# Patient Record
Sex: MALE | Race: WHITE
[De-identification: names, ages, dates, MRNs, and addresses within clinical notes are randomized per-mention and may not be internally consistent; named-entity substitution may affect disease eponyms.]

---

## 2017-03-17 ENCOUNTER — HOSPITAL ENCOUNTER (EMERGENCY)
Dept: HOSPITAL 74 - FER | Age: 82
Discharge: HOME | End: 2017-03-17
Payer: COMMERCIAL

## 2017-03-17 VITALS — HEART RATE: 77 BPM | TEMPERATURE: 98 F | DIASTOLIC BLOOD PRESSURE: 79 MMHG | SYSTOLIC BLOOD PRESSURE: 138 MMHG

## 2017-03-17 VITALS — BODY MASS INDEX: 27.6 KG/M2

## 2017-03-17 DIAGNOSIS — E11.9: ICD-10-CM
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
## 2017-03-17 DIAGNOSIS — I10: ICD-10-CM

## 2017-03-17 DIAGNOSIS — I48.91: ICD-10-CM
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
## 2017-03-17 DIAGNOSIS — Z87.891: ICD-10-CM

## 2017-03-17 DIAGNOSIS — N40.0: ICD-10-CM

## 2017-03-17 DIAGNOSIS — F03.90: ICD-10-CM

## 2017-03-17 DIAGNOSIS — S51.011A: Primary | ICD-10-CM

## 2017-03-17 DIAGNOSIS — Z79.84: ICD-10-CM

## 2017-03-17 PROCEDURE — 0HQDXZZ REPAIR RIGHT LOWER ARM SKIN, EXTERNAL APPROACH: ICD-10-PCS

## 2017-03-17 NOTE — PDOC
History of Present Illness





<Kelley Husain - Last Filed: 03/17/17 15:28>





- General


History Source: Patient


Exam Limitations: No Limitations





- History of Present Illness


Initial Comments: 





03/17/17 14:52


The patient is a 83 year old male presenting with his wife, with a significant 

past medical history of HTN, atrial fibrillation, HLD and diabetes, who 

presents to the emergency department with right elbow pain and laceration. He 

states that he slipped on ice earlier today and landed on his elbow. He notes 

that he has pain, that ranges from mild to moderate, without radiation or 

modifying factors. He is able to bend and extend the elbow. He denies any head 

trauma or loss of consciousness. He denies any other kind of injuries. He notes 

that he received his tetanus shot 3 years ago. 





The patient denies chest pain, shortness of breath, headache and dizziness. 





Allergies: None 


Past surgical history: Appendectomy, cholecystectomy 


Social history: No alcohol, tobacco or drug use reported 





<Van Ireland - Last Filed: 03/17/17 17:24>





- General


Chief Complaint: Injury


Stated Complaint: RIGHT ELBOW INJURY


Time Seen by Provider: 03/17/17 13:44





Past History





- Past Medical History


Anemia: No


Asthma: No


Cancer: No


Cardiac Disorders: Yes (ATRIAL FIBRILLATION,1990S)


CVA: No


COPD: No


CHF: No


Dementia:  (MEMORY LOSS)


Diabetes: Yes


GI Disorders: Yes


 Disorders: Yes (BPH)


HTN: Yes


Hypercholesterolemia: Yes


Liver Disease: No


Seizures: No


Thyroid Disease: No





- Surgical History


Abdominal Surgery:  (HERNIA REPAIR??)


Appendectomy: Yes


Cardiac Surgery: No


Cholecystectomy: Yes


Lung Surgery: No


Neurologic Surgery: No


Orthopedic Surgery: No





- Psycho/Social/Smoking Cessation Hx


Anxiety: No


Suicidal Ideation: No


Smoking History: Former smoker


Have you smoked in the past 12 months: No


If you are a former smoker, when did you quit?: 15 YRS AGO


Information on smoking cessation initiated: No


Hx Alcohol Use: No


Drug/Substance Use Hx: No


Substance Use Type: None


Hx Substance Use Treatment: No





<Kelley Husain - Last Filed: 03/17/17 15:28>





<Van Ireland - Last Filed: 03/17/17 17:24>





- Past Medical History


Allergies/Adverse Reactions: 


 Allergies











Allergy/AdvReac Type Severity Reaction Status Date / Time


 


tetanus immune globulin AdvReac Unknown UNSURE Verified 03/17/17 13:44











Home Medications: 


Ambulatory Orders





Aspirin [ASA -] 81 mg PO DAILY 06/26/14 


Atorvastatin Ca [Lipitor -] 30 mg PO HS 06/26/14 


Folic Acid/Multivit-Min/Lutein [Centrum Silver Chewable Tablet] 1 each PO 

UTDICT 06/26/14 


Metformin HCl [Metformin HCl ER] 500 mg PO BID 06/26/14 


Metoprolol Succinate [Toprol XL -] 150 mg PO DAILY 06/26/14 


Tamsulosin HCl 0.4 mg PO HS 06/26/14 


Valsartan [Diovan] 80 mg PO HS 06/26/14 


Vits A,C,E/Lutein/Minerals [Ocuvite Tablet] 1 each PO Q48H 06/26/14 


Cephalexin Monohydrate [Keflex -] 500 mg PO Q6H #28 capsule 03/17/17 


Memantine HCl [Namenda Xr] 21 mg PO DAILY 03/17/17 


Vit C/E/Zn/Coppr/Lutein/Zeaxan [Preservision Areds 2 Softgel] 1 each PO DAILY 03 /17/17 











**Review of Systems





- Review of Systems


Able to Perform ROS?: Yes


Comments:: 


03/17/17 14:52


GENERAL/CONSTITUTIONAL: No fever or chills. No weakness.


HEAD, EYES, EARS, NOSE AND THROAT: No change in vision. No ear pain or 

discharge. No sore throat.


MUSCULOSKELETAL: No joint or muscle swelling or pain. No neck or back pain.


EXTREMITIES: +Right elbow pain and laceration 


SKIN: No rash





<Van Ireland - Last Filed: 03/17/17 17:24>





*Physical Exam





- Vital Signs


 Last Vital Signs











Temp Pulse Resp BP Pulse Ox


 


 98 F   77   18   138/79   99 


 


 03/17/17 13:49  03/17/17 13:49  03/17/17 13:49  03/17/17 13:49  03/17/17 13:49














<Kelley Husain - Last Filed: 03/17/17 15:28>





- Vital Signs


 Last Vital Signs











Temp Pulse Resp BP Pulse Ox


 


 98 F   77   18   138/79   99 


 


 03/17/17 13:49  03/17/17 13:49  03/17/17 13:49  03/17/17 13:49  03/17/17 13:49














- Physical Exam


Comments: 


03/17/17 15:15


GENERAL: Awake, alert, and fully oriented, in no acute distress


HEAD: No signs of trauma, normocephalic, atraumatic 


EXTREMITIES: +2 cm irregular laceration to the right posterior elbow. Edema to 

the posterior part of the elbow. ,Remainder of extremities normal range of 

motion. No clubbing or cyanosis. 


SKIN: Warm, Dry, normal turgor, no rashes or lesions noted. 





<Van Ireland - Last Filed: 03/17/17 17:24>





Procedures





- Laceration/Wound Repair


  ** Right Posterior Elbow


Wound Length: to 2.5 cm


Wound Explored: clean, no foreign body present


Wound's Depth, Shape: irregular, contused tissue


Irrigated w/ Saline: Yes


Anesthesia: 1% Lidocaine


Amount of Anesthetic (ccs): 2


Wound Repaired With: Sutures


Suture Size/Type: 3:0, nylon


Layer Closure: No


Sterile Dressing Applied: Yes





<Kelley Husain - Last Filed: 03/17/17 15:28>





ED Treatment Course





- RADIOLOGY


Radiograph Interpretation: 





03/17/17 15:14


Right elbow x-ray


Reviewed by: Dr. Aashish Leslie


Impression: No acute bony abnormalities are seen.


Intact radial head. 





<Van Ireland - Last Filed: 03/17/17 17:24>





*DC/Admit/Observation/Transfer





- Discharge Dispostion


Admit: No





<Kelley Husain - Last Filed: 03/17/17 15:28>





- Attestations


Scribe Attestion: 





03/17/17 14:52


Documentation prepared by Van Ireland, acting as medical scribe for 

Kelley Husain MD





<Van Ireland - Last Filed: 03/17/17 17:24>


Diagnosis at time of Disposition: 


Laceration of elbow


Qualifiers:


 Encounter type: initial encounter Laterality: right Qualified Code(s): 

S51.011A - Laceration without foreign body of right elbow, initial encounter





- Discharge Dispostion


Disposition: HOME


Condition at time of disposition: Improved





- Prescriptions


Prescriptions: 


Cephalexin Monohydrate [Keflex -] 500 mg PO Q6H #28 capsule





- Patient Instructions


Printed Discharge Instructions:  DI for Laceration Repair -- Simple


Additional Instructions: 


KEEP THE WOUND DRY FOR 48 HOURS. AFTER THAT YOU CAN GET IT WET. DO NOT APPLY 

ANY LOTIONS, SOAPS, CREAMS, OINTMENTS. IF YOU WISH, KEEP A DRY GAUZE OVER IT 

AFTER 48 HOURS TO PREVENT THEM FROM CATCHING ON YOUR CLOTHING.





RETURN TO THE ER BETWEEN 3/26-3/28 TO HAVE THE STITCHES REMOVED. 





RETURN TO ER IMMEDIATELY IF YOU HAVE SWELLING, REDNESS, SEVERE PAIN, OR 

DRAINAGE OF PUS.

## 2017-03-20 ENCOUNTER — HOSPITAL ENCOUNTER (EMERGENCY)
Dept: HOSPITAL 74 - FER | Age: 82
LOS: 1 days | Discharge: HOME | End: 2017-03-21
Payer: COMMERCIAL

## 2017-03-20 VITALS — TEMPERATURE: 98.1 F | HEART RATE: 75 BPM | DIASTOLIC BLOOD PRESSURE: 68 MMHG | SYSTOLIC BLOOD PRESSURE: 135 MMHG

## 2017-03-20 VITALS — BODY MASS INDEX: 27.7 KG/M2

## 2017-03-20 DIAGNOSIS — E11.9: ICD-10-CM

## 2017-03-20 DIAGNOSIS — N40.0: ICD-10-CM

## 2017-03-20 DIAGNOSIS — Z87.891: ICD-10-CM

## 2017-03-20 DIAGNOSIS — Z79.84: ICD-10-CM

## 2017-03-20 DIAGNOSIS — L03.113: Primary | ICD-10-CM

## 2017-03-20 DIAGNOSIS — I10: ICD-10-CM

## 2017-03-20 DIAGNOSIS — I48.91: ICD-10-CM

## 2017-03-20 LAB
ALBUMIN SERPL-MCNC: 4 G/DL (ref 3.5–5)
ALP SERPL-CCNC: 58 U/L (ref 32–92)
ALT SERPL-CCNC: 15 U/L (ref 10–40)
ANION GAP SERPL CALC-SCNC: 5 MMOL/L (ref 8–16)
AST SERPL-CCNC: 25 U/L (ref 10–42)
BASOPHILS # BLD: 1 % (ref 0–2)
BILIRUB SERPL-MCNC: 0.6 MG/DL (ref 0.2–1)
CALCIUM SERPL-MCNC: 9.8 MG/DL (ref 8.4–10.2)
CO2 SERPL-SCNC: 31 MMOL/L (ref 22–28)
CREAT SERPL-MCNC: 0.9 MG/DL (ref 0.6–1.3)
DEPRECATED RDW RBC AUTO: 13.3 % (ref 11.9–15.9)
EOSINOPHIL # BLD: 2.4 % (ref 0–4.5)
GLUCOSE SERPL-MCNC: 101 MG/DL (ref 74–106)
MCH RBC QN AUTO: 32.4 PG (ref 25.7–33.7)
MCHC RBC AUTO-ENTMCNC: 33.4 G/DL (ref 32–35.9)
MCV RBC: 96.8 FL (ref 80–96)
NEUTROPHILS # BLD: 73.3 % (ref 42.8–82.8)
PLATELET # BLD AUTO: 189 K/MM3 (ref 134–434)
PMV BLD: 9 FL (ref 7.5–11.1)
PROT SERPL-MCNC: 6.8 G/DL (ref 6.4–8.3)
WBC # BLD AUTO: 8.4 K/MM3 (ref 4–10)

## 2017-03-20 NOTE — PDOC
History of Present Illness





- General


Chief Complaint: Revisit,Wound Recheck


Stated Complaint: RT ELBOW WOUND CHECK, SUTURES IN PLACE


Time Seen by Provider: 03/20/17 21:17





- History of Present Illness


Initial Comments: 





This 83-year-old man with a history of hypertension and type II DM (reportedly 

well controlled on metformin only) presents with increased swelling/redness and 

pain in the area of laceration of the right elbow.  Patient had slipped on the 

ice 3 days ago and was seen here in the emergency room.  Workup including elbow 

x-ray was negative at that time and laceration was sutured.  Patient was sent 

home on Keflex course; patient and wife state that he has been taking the 

antibiotic as prescribed.  There has been no fever or chills.  Initial bandage 

was taken off earlier today; wife noted that the area looked somewhat red and 

brought the patient in this evening for evaluation.  Patient states that there 

is some white of an increase in pain on direct palpation of the olecranon but 

notes no increase in pain without palpitation.


No history of poor wound healing; no history of resistant organism colonization 

or infection.





Medications as noted below.








Past History





- Past Medical History


Allergies/Adverse Reactions: 


 Allergies











Allergy/AdvReac Type Severity Reaction Status Date / Time


 


tetanus immune globulin AdvReac Unknown UNSURE Verified 03/20/17 21:15











Home Medications: 


Ambulatory Orders





Aspirin [ASA -] 81 mg PO DAILY 06/26/14 


Atorvastatin Ca [Lipitor -] 30 mg PO HS 06/26/14 


Folic Acid/Multivit-Min/Lutein [Centrum Silver Chewable Tablet] 1 each PO 

UTDICT 06/26/14 


Metformin HCl [Metformin HCl ER] 500 mg PO BID 06/26/14 


Metoprolol Succinate [Toprol XL -] 150 mg PO DAILY 06/26/14 


Tamsulosin HCl 0.4 mg PO HS 06/26/14 


Valsartan [Diovan] 80 mg PO HS 06/26/14 


Vits A,C,E/Lutein/Minerals [Ocuvite Tablet] 1 each PO Q48H 06/26/14 


Cephalexin Monohydrate [Keflex -] 500 mg PO Q6H #28 capsule 03/17/17 


Memantine HCl [Namenda Xr] 21 mg PO DAILY 03/17/17 


Vit C/E/Zn/Coppr/Lutein/Zeaxan [Preservision Areds 2 Softgel] 1 each PO DAILY 03 /17/17 


Clindamycin HCl 300 mg PO TID #21 capsule 03/21/17 








Anemia: No


Asthma: No


Cancer: No


Cardiac Disorders: Yes (ATRIAL FIBRILLATION,1990S)


CVA: No


COPD: No


CHF: No


Dementia:  (MEMORY LOSS)


Diabetes: Yes


GI Disorders: Yes


 Disorders: Yes (BPH)


HTN: Yes


Hypercholesterolemia: Yes


Liver Disease: No


Seizures: No


Thyroid Disease: No





- Surgical History


Abdominal Surgery:  (HERNIA REPAIR??)


Appendectomy: Yes


Cardiac Surgery: No


Cholecystectomy: Yes


Lung Surgery: No


Neurologic Surgery: No


Orthopedic Surgery: No





- Psycho/Social/Smoking Cessation Hx


Anxiety: No


Suicidal Ideation: No


Smoking History: Former smoker


Have you smoked in the past 12 months: No


If you are a former smoker, when did you quit?: 15 YRS AGO


Information on smoking cessation initiated: No


Hx Alcohol Use: No


Drug/Substance Use Hx: No


Substance Use Type: None


Hx Substance Use Treatment: No





**Review of Systems





- Review of Systems


Able to Perform ROS?: Yes


Comments:: 





12 point review of systems is negative except for what is noted in the history 

of present illness








*Physical Exam





- Vital Signs


 Last Vital Signs











Temp Pulse Resp BP Pulse Ox


 


 98.1 F   75   18   135/68   97 


 


 03/20/17 21:15  03/20/17 21:15  03/20/17 21:15  03/20/17 21:15  03/20/17 21:15














- Physical Exam


Comments: 





GENERAL: Adult male, alert and oriented 3, in no acute distress


HEAD: Normal with no signs of trauma.


EYES: PERRLA, EOMI, sclera anicteric, conjunctiva clear.


ENT: Ears normal, nares patent, oropharynx clear without exudates.  Dry mucous 

membranes.


NECK: Normal range of motion, supple without lymphadenopathy, JVD, or masses.


LUNGS: Breath sounds equal, clear to auscultation bilaterally.  No wheezes, and 

no crackles.


HEART:Regular rate and rhythm, normal S1 and S2 without murmur, rub or gallop.


ABDOMEN:.normal bowel sounds  No guarding,tenderness or rebound.No masses No 

distention. 


EXTREMITIES: Right upper extremitymildly edematous, moderately erythematous 3 

x 8 cm area surrounding sutured laceration 


                                                       Laceration is in the 

volar aspect of the proximal aspect of fo forearm and intact


                                                       olecranon processes 

mildly tender


.                                                       No fluctuance or 

purulent discharge from the wound


                                                     No lymphangitic streaking


                               Remainder of the extremity exam is normal





NEURO: Cranial nerves II through XII grossly intact.  Normal speech.  No focal 


neurological deficits. 


MUSCULOSKELETAL:  Back non-tender to palpation, no CVA tenderness


SKIN: Warm, Dry, normal turgor, no rashes or lesions noted.








ED Treatment Course





- LABORATORY


CBC & Chemistry Diagram: 


 03/20/17 22:50





 03/20/17 22:50





Medical Decision Making





- Medical Decision Making





CBC and chemistry profile sent


1 g vancomycin IV administered.





Laboratory evaluation essentially normal.





Repeat right elbow x-ray performed to evaluate for presence of gas in soft 

tissue: None seen.





Clinical presentation most consistent with cellulitis in area of laceration.  

No drainage present for culture and sensitivity testing.  Patient was started 

on clindamycin 300 mg 3 times a day with follow-up with his general doctor 

within the next 2-3 days.


He should return to the emergency room if pain/swelling/redness worsens; if he 

notices any lymphangitic streaking or fever develops.








*DC/Admit/Observation/Transfer


Diagnosis at time of Disposition: 


 Cellulitis of right elbow





- Discharge Dispostion


Disposition: HOME


Condition at time of disposition: Stable





- Prescriptions


Prescriptions: 


Clindamycin HCl 300 mg PO TID #21 capsule





- Referrals


Referrals: 


Ludwin Gonzalez RN [Primary Care Provider] - 





- Patient Instructions


Printed Discharge Instructions:  Cellulitis


Additional Instructions: 


stop keflex


begin Clindamycin 300mg 3 X day for 1 week


followup with your doctor within 2 days


return to ER if you have worsening swelling/pain/redness/warmth of area


return to ER if you have any red streaking up your arm or fever

## 2018-05-30 ENCOUNTER — APPOINTMENT (OUTPATIENT)
Dept: CARDIOLOGY | Facility: CLINIC | Age: 83
End: 2018-05-30

## 2018-05-30 VITALS
HEART RATE: 62 BPM | TEMPERATURE: 98.2 F | HEIGHT: 73 IN | BODY MASS INDEX: 27.83 KG/M2 | SYSTOLIC BLOOD PRESSURE: 117 MMHG | OXYGEN SATURATION: 94 % | WEIGHT: 210 LBS | DIASTOLIC BLOOD PRESSURE: 79 MMHG

## 2018-05-30 DIAGNOSIS — N40.0 BENIGN PROSTATIC HYPERPLASIA WITHOUT LOWER URINARY TRACT SYMPMS: ICD-10-CM

## 2018-05-30 DIAGNOSIS — Z87.891 PERSONAL HISTORY OF NICOTINE DEPENDENCE: ICD-10-CM

## 2018-05-30 DIAGNOSIS — Z86.69 PERSONAL HISTORY OF OTHER DISEASES OF THE NERVOUS SYSTEM AND SENSE ORGANS: ICD-10-CM

## 2018-05-30 DIAGNOSIS — Z86.79 PERSONAL HISTORY OF OTHER DISEASES OF THE CIRCULATORY SYSTEM: ICD-10-CM

## 2018-05-30 DIAGNOSIS — Z82.49 FAMILY HISTORY OF ISCHEMIC HEART DISEASE AND OTHER DISEASES OF THE CIRCULATORY SYSTEM: ICD-10-CM

## 2018-05-30 DIAGNOSIS — Z86.39 PERSONAL HISTORY OF OTHER ENDOCRINE, NUTRITIONAL AND METABOLIC DISEASE: ICD-10-CM

## 2018-05-30 DIAGNOSIS — Z78.9 OTHER SPECIFIED HEALTH STATUS: ICD-10-CM

## 2018-05-30 DIAGNOSIS — Z87.19 PERSONAL HISTORY OF OTHER DISEASES OF THE DIGESTIVE SYSTEM: ICD-10-CM

## 2018-05-30 PROBLEM — Z00.00 ENCOUNTER FOR PREVENTIVE HEALTH EXAMINATION: Status: ACTIVE | Noted: 2018-05-30

## 2018-06-11 RX ORDER — VIT A/VIT C/VIT E/ZINC/COPPER 2148-113
TABLET ORAL
Refills: 0 | Status: ACTIVE | COMMUNITY

## 2018-06-11 RX ORDER — MULTIVITAMIN
TABLET ORAL
Refills: 0 | Status: ACTIVE | COMMUNITY

## 2018-06-25 ENCOUNTER — NON-APPOINTMENT (OUTPATIENT)
Age: 83
End: 2018-06-25

## 2018-06-25 PROBLEM — Z86.39 HISTORY OF DIABETES MELLITUS: Status: RESOLVED | Noted: 2018-06-25 | Resolved: 2018-06-25

## 2018-06-25 PROBLEM — Z86.79 HISTORY OF HYPERTENSION: Status: RESOLVED | Noted: 2018-06-25 | Resolved: 2018-06-25

## 2018-06-25 PROBLEM — Z86.79 HISTORY OF RIGHT BUNDLE BRANCH BLOCK (RBBB): Status: RESOLVED | Noted: 2018-06-25 | Resolved: 2018-06-25

## 2018-06-25 PROBLEM — Z86.69 HISTORY OF MACULAR DEGENERATION: Status: RESOLVED | Noted: 2018-06-25 | Resolved: 2018-06-25

## 2018-06-25 PROBLEM — N40.0 BPH WITHOUT URINARY OBSTRUCTION: Status: RESOLVED | Noted: 2018-06-25 | Resolved: 2018-06-25

## 2018-06-26 PROBLEM — Z82.49 FAMILY HISTORY OF CONGESTIVE HEART FAILURE: Status: ACTIVE | Noted: 2018-06-26

## 2018-06-26 PROBLEM — Z87.19 HISTORY OF GASTROESOPHAGEAL REFLUX (GERD): Status: RESOLVED | Noted: 2018-06-26 | Resolved: 2018-06-26

## 2018-06-26 PROBLEM — Z78.9 SOCIAL ALCOHOL USE: Status: ACTIVE | Noted: 2018-06-26

## 2018-06-26 PROBLEM — Z87.891 FORMER SMOKER: Status: ACTIVE | Noted: 2018-06-26

## 2018-07-31 ENCOUNTER — RX RENEWAL (OUTPATIENT)
Age: 83
End: 2018-07-31

## 2018-11-01 ENCOUNTER — RECORD ABSTRACTING (OUTPATIENT)
Age: 83
End: 2018-11-01

## 2018-11-19 ENCOUNTER — RX RENEWAL (OUTPATIENT)
Age: 83
End: 2018-11-19

## 2018-11-28 ENCOUNTER — APPOINTMENT (OUTPATIENT)
Dept: CARDIOLOGY | Facility: CLINIC | Age: 83
End: 2018-11-28
Payer: MEDICARE

## 2018-11-28 VITALS
HEIGHT: 73 IN | OXYGEN SATURATION: 100 % | SYSTOLIC BLOOD PRESSURE: 123 MMHG | BODY MASS INDEX: 28.36 KG/M2 | WEIGHT: 214 LBS | DIASTOLIC BLOOD PRESSURE: 55 MMHG | TEMPERATURE: 98.1 F | HEART RATE: 63 BPM

## 2018-11-28 PROCEDURE — 93000 ELECTROCARDIOGRAM COMPLETE: CPT

## 2018-11-28 PROCEDURE — 99215 OFFICE O/P EST HI 40 MIN: CPT

## 2018-11-28 NOTE — REVIEW OF SYSTEMS
[Feeling Fatigued] : feeling fatigued [Eyeglasses] : currently wearing eyeglasses [Loss Of Hearing] : hearing loss [see HPI] : see HPI [Joint Pain] : joint pain [Negative] : Heme/Lymph

## 2018-11-29 ENCOUNTER — APPOINTMENT (OUTPATIENT)
Dept: PULMONOLOGY | Facility: CLINIC | Age: 83
End: 2018-11-29

## 2018-11-29 ENCOUNTER — APPOINTMENT (OUTPATIENT)
Dept: PODIATRY | Facility: CLINIC | Age: 83
End: 2018-11-29
Payer: MEDICARE

## 2018-11-29 VITALS
HEIGHT: 73 IN | WEIGHT: 210 LBS | BODY MASS INDEX: 27.83 KG/M2 | DIASTOLIC BLOOD PRESSURE: 70 MMHG | SYSTOLIC BLOOD PRESSURE: 110 MMHG

## 2018-11-29 PROCEDURE — 11721 DEBRIDE NAIL 6 OR MORE: CPT

## 2018-11-29 RX ORDER — RIVASTIGMINE 9.5 MG/24H
9.5 PATCH, EXTENDED RELEASE TRANSDERMAL DAILY
Refills: 0 | Status: DISCONTINUED | COMMUNITY
End: 2018-11-29

## 2018-12-06 ENCOUNTER — NON-APPOINTMENT (OUTPATIENT)
Age: 83
End: 2018-12-06

## 2018-12-06 NOTE — HISTORY OF PRESENT ILLNESS
[FreeTextEntry1] : 85-year-old man\par Routine followup\par Boby states that he feels well and denies chest pain palpitations or syncope. His wife reports that she feels he experiences mild dyspnea on exertion. The symptoms are not progressive or severe. No orthopnea. No ankle edema.\par \par Mr. Mir is accompanied today by his wife.

## 2018-12-06 NOTE — DISCUSSION/SUMMARY
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 Boby has had atrial fibrillation since 1988. Prior attempts at electrocardioversion were unsuccessful. Adequate rate control of the ventricular response is achieved with the present medical regimen. A 3/14 Holter monitor demonstrated atrial fibrillation with an average ventricular rate of 64/minute. A 3.2 second pause without symptom correlation was noted. Lanoxin therapy administered at that time was discontinued. An elevated "[id number]" score is indicative of an increased risk of systemic/renal emboli. However due to concerns for bleeding in the setting of vascular dementia the risk of anticoagulation/antithrombotic therapy outweighs any potential benefit.\par \par I have recommended the following:\par 1 continue present medical regimen\par 2 no further cardiac testing for this problem at this time\par \par \par Hypertension\par Hypertension is controlled on the present medical regimen. In the setting of diabetes and probable atherosclerotic heart disease, beta blocker and Ace-I./ARB therapy are attractive. The dose of Diovan may be increased if required to maintain optimal levels\par \par I have recommended the following:\par 1 continue the present medical regimen\par 2 increased dose of Diovan if required to maintain optimal levels as discussed above.\par \par \par Valvular heart disease\par The 3/14 echocardiogram demonstrated mild-moderate mitral and aortic insufficiency. Mild pulmonary hypertension was noted with a pulmonary artery systolic pressure of 38 mmHg. Left ventricle systolic function was normal with an ejection fraction of 62%. The present cardiac physical examination is not suggestive of severe aortic/mitral regurgitation.Echocardiography would be helpful to reassess left ventricular and valvular function.\par \par I have recommended the following:\par 1 echocardiogram with next office evaluation in 6 months.\par \par \par Hyperlipidemia\par Hyperlipidemia represents a risk factor for the development of atherosclerotic heart disease. However there is no evidence of such to date. A 3/11 and stress sestamibi study was normal. The left ventricle ejection fraction was 56% . The resting 11/18 electrocardiogram shows no evidence of myocardial ischemia or infarction. The target LDL level for primary prevention is about 100. HMG CoA inhibitor therapy has been effective. In 4/18 the  serum cholesterol level was 123 HDL 49 LDL 58  and triglycerides 77. Consideration will be given towards a reduction in dose of Lipitor dependent on followup levels. Non-pharmacological therapy, specifically diet and exercise are emphasized as major aspects of treatment.\par \par I have recommended the following:\par 1 low-salt low-fat low-cholesterol diet. Regular aerobic exercise\par 2 continue present medical regimen\par 3 target LDL level to about 100 as discussed above\par 4 consideration for a reduction in atorvastatin dose dependent on followup lipid levels as noted above.\par 5 routine laboratory studies including lipid profile through primary care Dr. Celestin\par \par \par \par Dementia\par The working diagnosis is vascular dementia. In the setting of chronic atrial fibrillation multi-infarct dementia is a likely etiology. This problem represents the patient's major health threat\par \par I have recommended a followup:\par 1. Workup and treatment as directed by internal medicine

## 2018-12-06 NOTE — PHYSICAL EXAM
[Normal Conjunctiva] : the conjunctiva exhibited no abnormalities [Auscultation Breath Sounds / Voice Sounds] : lungs were clear to auscultation bilaterally [Heart Sounds] : normal S1 and S2 [Murmurs] : no murmurs present [Bowel Sounds] : normal bowel sounds [Abdomen Soft] : soft [Abdomen Tenderness] : non-tender [Abnormal Walk] : normal gait [Cyanosis, Localized] : no localized cyanosis [] : no rash [Affect] : the affect was normal [FreeTextEntry1] : pleasant but forgetful

## 2018-12-18 ENCOUNTER — APPOINTMENT (OUTPATIENT)
Dept: INTERNAL MEDICINE | Facility: CLINIC | Age: 83
End: 2018-12-18
Payer: MEDICARE

## 2018-12-18 VITALS
HEIGHT: 73 IN | DIASTOLIC BLOOD PRESSURE: 66 MMHG | BODY MASS INDEX: 28.36 KG/M2 | SYSTOLIC BLOOD PRESSURE: 122 MMHG | WEIGHT: 214 LBS

## 2018-12-18 PROCEDURE — 99214 OFFICE O/P EST MOD 30 MIN: CPT

## 2018-12-18 NOTE — COUNSELING
[de-identified] : Pt counseled on proper diet and exercise. We discussed the importance of exercise in maintaining a healthy life style.\par

## 2018-12-18 NOTE — HISTORY OF PRESENT ILLNESS
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 All went well with them.\par Pt doing well overall. He is here with his wife. \par He has no chest pains or SOB. However does not get much activity overall. \par No recent falls\par

## 2018-12-18 NOTE — PHYSICAL EXAM
[No Acute Distress] : no acute distress [Well Nourished] : well nourished [Well Developed] : well developed [Well-Appearing] : well-appearing [No Respiratory Distress] : no respiratory distress  [Clear to Auscultation] : lungs were clear to auscultation bilaterally [Normal Rate] : normal rate  [Regular Rhythm] : with a regular rhythm [Normal S1, S2] : normal S1 and S2 [No Murmur] : no murmur heard [No Edema] : there was no peripheral edema [Soft] : abdomen soft [Non Tender] : non-tender [Non-distended] : non-distended [Normal Affect] : the affect was normal [Normal Insight/Judgement] : insight and judgment were intact [de-identified] : scars on scalp and face

## 2019-01-07 ENCOUNTER — RESULT REVIEW (OUTPATIENT)
Age: 84
End: 2019-01-07

## 2019-01-14 ENCOUNTER — INBOUND DOCUMENT (OUTPATIENT)
Age: 84
End: 2019-01-14

## 2019-01-21 ENCOUNTER — INBOUND DOCUMENT (OUTPATIENT)
Age: 84
End: 2019-01-21

## 2019-01-22 ENCOUNTER — RX RENEWAL (OUTPATIENT)
Age: 84
End: 2019-01-22

## 2019-01-23 ENCOUNTER — RX RENEWAL (OUTPATIENT)
Age: 84
End: 2019-01-23

## 2019-01-24 ENCOUNTER — APPOINTMENT (OUTPATIENT)
Dept: INTERNAL MEDICINE | Facility: CLINIC | Age: 84
End: 2019-01-24
Payer: MEDICARE

## 2019-01-24 VITALS
HEIGHT: 73 IN | DIASTOLIC BLOOD PRESSURE: 74 MMHG | BODY MASS INDEX: 27.17 KG/M2 | SYSTOLIC BLOOD PRESSURE: 124 MMHG | WEIGHT: 205 LBS

## 2019-01-24 PROCEDURE — 99495 TRANSJ CARE MGMT MOD F2F 14D: CPT

## 2019-01-24 RX ORDER — ASPIRIN 81 MG
81 TABLET, DELAYED RELEASE (ENTERIC COATED) ORAL
Refills: 0 | Status: DISCONTINUED | COMMUNITY
End: 2019-01-24

## 2019-01-24 RX ORDER — VALSARTAN 80 MG/1
80 TABLET ORAL
Refills: 0 | Status: DISCONTINUED | COMMUNITY
End: 2019-01-24

## 2019-01-24 NOTE — HISTORY OF PRESENT ILLNESS
[Admitted on: ___] : The patient was admitted on [unfilled] [Discharged on ___] : discharged on [unfilled] [Discharge Summary] : discharge summary [Pertinent Labs] : pertinent labs [Radiology Findings] : radiology findings [Discharge Med List] : discharge medication list [Med Reconciliation] : medication reconciliation has been completed [Patient Contacted By: ____] : and contacted by [unfilled] [FreeTextEntry2] : Pt was admitted to the hospital for CVA. Pt was brought to the hospital due to slurred speech by ambulance.  The speech improved by the next morning Pt had CT brain and CT angio as well as MRI brain showing cerebellar infarct. He was d/c/d to a rehab for over 1 week. \par Pt is still unsteady with his walking. He has no cane or walker. \par Otherwise he is back to baseline

## 2019-01-24 NOTE — REVIEW OF SYSTEMS
[Memory Loss] : memory loss [Negative] : Musculoskeletal [Dizziness] : no dizziness [de-identified] : chronic memory loss

## 2019-01-24 NOTE — PHYSICAL EXAM
[No Acute Distress] : no acute distress [Well Nourished] : well nourished [Well Developed] : well developed [Well-Appearing] : well-appearing [No Respiratory Distress] : no respiratory distress  [Clear to Auscultation] : lungs were clear to auscultation bilaterally [No Accessory Muscle Use] : no accessory muscle use [Normal Rate] : normal rate  [No Edema] : there was no peripheral edema [Normal S1, S2] : normal S1 and S2 [No Murmur] : no murmur heard [No Carotid Bruits] : no carotid bruits [Grossly Normal Strength/Tone] : grossly normal strength/tone [Normal Gait] : normal gait [No Focal Deficits] : no focal deficits [Normal Affect] : the affect was normal [Normal Mood] : the mood was normal [de-identified] : irreg [de-identified] : apparent memory issues

## 2019-01-30 ENCOUNTER — APPOINTMENT (OUTPATIENT)
Dept: CARDIOLOGY | Facility: CLINIC | Age: 84
End: 2019-01-30
Payer: MEDICARE

## 2019-01-30 VITALS
SYSTOLIC BLOOD PRESSURE: 126 MMHG | DIASTOLIC BLOOD PRESSURE: 54 MMHG | HEART RATE: 56 BPM | BODY MASS INDEX: 27.04 KG/M2 | OXYGEN SATURATION: 97 % | WEIGHT: 204 LBS | TEMPERATURE: 98.1 F | HEIGHT: 73 IN

## 2019-01-30 DIAGNOSIS — Z86.39 PERSONAL HISTORY OF OTHER ENDOCRINE, NUTRITIONAL AND METABOLIC DISEASE: ICD-10-CM

## 2019-01-30 DIAGNOSIS — Z86.73 PERSONAL HISTORY OF TRANSIENT ISCHEMIC ATTACK (TIA), AND CEREBRAL INFARCTION W/OUT RESIDUAL DEFICITS: ICD-10-CM

## 2019-01-30 PROCEDURE — 99215 OFFICE O/P EST HI 40 MIN: CPT

## 2019-02-02 PROBLEM — Z86.39 HISTORY OF HYPERLIPIDEMIA: Status: RESOLVED | Noted: 2018-06-25 | Resolved: 2019-02-02

## 2019-02-02 PROBLEM — Z86.73 HISTORY OF CEREBROVASCULAR ACCIDENT: Status: RESOLVED | Noted: 2019-02-02 | Resolved: 2019-02-02

## 2019-02-02 NOTE — DISCUSSION/SUMMARY
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 Boby has had atrial fibrillation since 1988. Prior attempts at electrocardioversion were unsuccessful. Adequate rate control of the ventricular response is achieved with the present medical regimen. A 3/14 Holter monitor demonstrated atrial fibrillation with an average ventricular rate of 64/minute. A 3.2 second pause without symptom correlation was noted. Lanoxin therapy administered at that time was discontinued. An elevated "[id number]" score is indicative of an increased risk of systemic/cerebral  emboli. .Prior  treatment with warfarin was discontinued due to concerns for falling/bleeding. However in 1/19 Apixaban was instituted following cardioembolic bilateral occipital strokes.\par \par I have recommended the following:\par 1 continue present medical regimen\par 2 no further cardiac testing for this problem at this time\par \par \par Hypertension\par Hypertension is controlled on the present medical regimen. In the setting of diabetes atrial fibrillation  and probable atherosclerotic heart disease, beta blocker and Ace-I./ARB therapy are attractive. The dose of losartan  may be increased if required to maintain optimal levels\par \par I have recommended the following:\par 1 continue the present medical regimen\par 2 increase dose losartan if required to maintain optimal levels as discussed above.\par \par \par Valvular heart disease\par The 1/19 echocardiogram demonstrated mild mitral insufficiency aortic valve sclerosis and mild aortic insufficiency. Mild-moderate tricuspid insufficiency was also noted. Moderate pulmonary hypertension was reflected by pulmonary artery systolic pressure of 45 mmHg. The left atrial volume was markedly increased at 122 mL/m2. The left ventricle ejection fraction was 60%. The present cardiac physical examination is not suggestive of severe mitral/aortic regurgitation. In view of the lack of symptoms, absence of heart failure and clinical course continued monitoring without intervention is indicated at this time.\par \par I have recommended the following:\par 1 no further cardiac testing for this problem at this time\par I\par \par \par Hyperlipidemia\par Hyperlipidemia represents a risk factor for the development of atherosclerotic heart disease. However there is no evidence of such to date. A 3/11  stress sestamibi study was normal. The left ventricle ejection fraction was 56% . The resting 11/18 electrocardiogram shows no evidence of myocardial ischemia or infarction. Now with a history of a stroke (Despite a cardioembolic etiology) the target LDL level is about 70.. HMG CoA inhibitor therapy has been effective. In 1/19  the  serum cholesterol level was 135  HDL 56  LDL 64 and triglycerides 72. . Non-pharmacological therapy, specifically diet and exercise are emphasized as major aspects of treatment.\par \par I have recommended the following:\par 1 low-salt low-fat low-cholesterol diet. Regular aerobic exercise\par 2 continue present medical regimen\par 3 target LDL level to about 100 as discussed above\par \par \par \par Dementia\par The working diagnosis is vascular dementia. In the setting of chronic atrial fibrillation multi-infarct dementia is a likely etiology. This problem represents the patient's major health threat\par .Further deterioration of mental and functional status is anticipated in the future with requirements for increasing social service help.\par \par I have recommended  the following:\par 1. Workup and treatment as directed by internal medicine

## 2019-02-02 NOTE — HISTORY OF PRESENT ILLNESS
[FreeTextEntry1] : 85-year-old man\par Routine followup of hospital\par 1/19 hospitalization for slurred speech abnormal gait nausea and vomiting. MRI of the brain demonstrated recent right cerebellar and small focus of left parietal occipital cortical infarcts Diagnosis cardioembolic stroke factor X A. inhibitor therapy initiated. See hospital records\par \par Mr. Mir denies chest pain palpitations shortness of breath or focal neurological deficit.\par \par He is accompanied by his wife who confirms the history  as Mr. Mir  is forgetful

## 2019-02-02 NOTE — DISCUSSION/SUMMARY
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 Boby has had atrial fibrillation since 1988. Prior attempts at electrocardioversion were unsuccessful. Adequate rate control of the ventricular response is achieved with the present medical regimen. A 3/14 Holter monitor demonstrated atrial fibrillation with an average ventricular rate of 64/minute. A 3.2 second pause without symptom correlation was noted. Lanoxin therapy administered at that time was discontinued. An elevated "[id number]" score is indicative of an increased risk of systemic/cerebral  emboli. .Prior  treatment with warfarin was discontinued due to concerns for falling/bleeding. However in 1/19 Apixaban was instituted following cardioembolic bilateral occipital strokes.\par \par I have recommended the following:\par 1 continue present medical regimen\par 2 no further cardiac testing for this problem at this time\par \par \par Hypertension\par Hypertension is controlled on the present medical regimen. In the setting of diabetes atrial fibrillation  and probable atherosclerotic heart disease, beta blocker and Ace-I./ARB therapy are attractive. The dose of losartan  may be increased if required to maintain optimal levels\par \par I have recommended the following:\par 1 continue the present medical regimen\par 2 increase dose losartan if required to maintain optimal levels as discussed above.\par \par \par Valvular heart disease\par The 1/19 echocardiogram demonstrated mild mitral insufficiency aortic valve sclerosis and mild aortic insufficiency. Mild-moderate tricuspid insufficiency was also noted. Moderate pulmonary hypertension was reflected by pulmonary artery systolic pressure of 45 mmHg. The left atrial volume was markedly increased at 122 mL/m2. The left ventricle ejection fraction was 60%. The present cardiac physical examination is not suggestive of severe mitral/aortic regurgitation. In view of the lack of symptoms, absence of heart failure and clinical course continued monitoring without intervention is indicated at this time.\par \par I have recommended the following:\par 1 no further cardiac testing for this problem at this time\par I\par \par \par Hyperlipidemia\par Hyperlipidemia represents a risk factor for the development of atherosclerotic heart disease. However there is no evidence of such to date. A 3/11  stress sestamibi study was normal. The left ventricle ejection fraction was 56% . The resting 11/18 electrocardiogram shows no evidence of myocardial ischemia or infarction. Now with a history of a stroke (Despite a cardioembolic etiology) the target LDL level is about 70.. HMG CoA inhibitor therapy has been effective. In 1/19  the  serum cholesterol level was 135  HDL 56  LDL 64 and triglycerides 72. . Non-pharmacological therapy, specifically diet and exercise are emphasized as major aspects of treatment.\par \par I have recommended the following:\par 1 low-salt low-fat low-cholesterol diet. Regular aerobic exercise\par 2 continue present medical regimen\par 3 target LDL level to about 100 as discussed above\par \par \par \par Dementia\par The working diagnosis is vascular dementia. In the setting of chronic atrial fibrillation multi-infarct dementia is a likely etiology. This problem represents the patient's major health threat\par .Further deterioration of mental and functional status is anticipated in the future with requirements for increasing social service help.\par \par I have recommended  the following:\par 1. Workup and treatment as directed by internal medicine

## 2019-02-04 ENCOUNTER — RX RENEWAL (OUTPATIENT)
Age: 84
End: 2019-02-04

## 2019-02-13 ENCOUNTER — RX RENEWAL (OUTPATIENT)
Age: 84
End: 2019-02-13

## 2019-02-15 ENCOUNTER — RX RENEWAL (OUTPATIENT)
Age: 84
End: 2019-02-15

## 2019-02-25 ENCOUNTER — APPOINTMENT (OUTPATIENT)
Dept: NEUROLOGY | Facility: CLINIC | Age: 84
End: 2019-02-25
Payer: MEDICARE

## 2019-02-25 VITALS
DIASTOLIC BLOOD PRESSURE: 65 MMHG | SYSTOLIC BLOOD PRESSURE: 109 MMHG | TEMPERATURE: 96.3 F | BODY MASS INDEX: 12.76 KG/M2 | HEIGHT: 73 IN | WEIGHT: 96.3 LBS | HEART RATE: 79 BPM

## 2019-02-25 PROCEDURE — 99215 OFFICE O/P EST HI 40 MIN: CPT

## 2019-03-01 ENCOUNTER — APPOINTMENT (OUTPATIENT)
Dept: PODIATRY | Facility: CLINIC | Age: 84
End: 2019-03-01
Payer: MEDICARE

## 2019-03-01 VITALS
HEIGHT: 73 IN | SYSTOLIC BLOOD PRESSURE: 120 MMHG | BODY MASS INDEX: 23.86 KG/M2 | DIASTOLIC BLOOD PRESSURE: 60 MMHG | WEIGHT: 180 LBS

## 2019-03-01 PROCEDURE — 11721 DEBRIDE NAIL 6 OR MORE: CPT | Mod: 59

## 2019-03-01 NOTE — PHYSICAL EXAM
[Right Foot Was Examined] : The right foot was examined [Left Foot Was Examined] : The left foot was examined [Normal Appearance] : normal in appearance [Delayed in the Right Toes] : delayed in the toes [Normal in Appearance] : normal in appearance [Tenderness] : tender [Delayed in the Left Toes] : delayed in the toes [1+] : 1+ in the dorsalis pedis [Diminished Throughout Right Foot] : diminished tactile sensation with monofilament testing throughout right foot [Diminished Throughout Left Foot] : diminished tactile sensation with monofilament testing throughout left foot [Vibration Dec.] : normal vibratory sensation at the level of the toes [Full ROM] : with limited range of motion [Swelling] : not swollen [Erythema] : not erythematous [Position Sense Dec.] : normal position sense at the level of the toes

## 2019-03-01 NOTE — PROCEDURE
[FreeTextEntry1] : The patient has all contributing factors for peripheral arterial disease including but not limited to decreased or absent pedal pulses and hair growth, delayed capillary fill time, shiny trophic skin changes and occaisional claudication. Patient also states that there has been a history of rest pain. Signs and symptoms consistent with peripheral arterial disease\par The patient has all contributing factors to onychomycosis including but not limited to thickness, subungual debris, discoloration and partial lysis and they are brittle when cut.\par \par Using sterile instrumentation debridement of all nails manually and electrically to decrease thickness, pain and girth and make shoe gear more comfortable with "slant back" procedure of any bordering spicules causing pain\par \par

## 2019-03-10 ENCOUNTER — RX RENEWAL (OUTPATIENT)
Age: 84
End: 2019-03-10

## 2019-03-14 NOTE — REASON FOR VISIT
[Spouse] : spouse [Post Hospitalization] : a post hospitalization visit [FreeTextEntry1] : 86 yo present for stroke follow up

## 2019-03-14 NOTE — HISTORY OF PRESENT ILLNESS
[FreeTextEntry1] : Mr. Mir is a 85 year old male who is here for a post hospital follow up. He was seen by in January for acute stroke.\par 85 year old male with history of afib not on anticoagulation presents to ED with sudden onset nausea, vomiting, dysarthria and loss of balance. The patient was last seen normal at 2pm by his wife. She returned around 4pm and found him to have above symptoms. \par NIHSS on initial ED eval was 4. \par CT head stat- no acute stroke. \par Patient was initially thought to be a candidate for tpa as the last known normal was thought to be 4pm. However, the wife stated that she had not seen him since noon.\par \par MRI brain showed- multifocal cerebellar and parietal infarcts suffestive of cardioembolic etiology. CTA head and neck- no hemodynamically significant stenosis.\par \par He ws started on Eliquis 5mg bid.\par \par No events since discharge.\par \par \par

## 2019-03-14 NOTE — PHYSICAL EXAM
[General Appearance - Alert] : alert [General Appearance - In No Acute Distress] : in no acute distress [Oriented To Time, Place, And Person] : oriented to person, place, and time [Impaired Insight] : insight and judgment were intact [Affect] : the affect was normal [Person] : oriented to person [Place] : oriented to place [Span Intact] : the attention span was normal [Naming Objects] : no difficulty naming common objects [Fluency] : fluency intact [Comprehension] : comprehension intact [Cranial Nerves Optic (II)] : visual acuity intact bilaterally,  visual fields full to confrontation, pupils equal round and reactive to light [Cranial Nerves Oculomotor (III)] : extraocular motion intact [Cranial Nerves Trigeminal (V)] : facial sensation intact symmetrically [Cranial Nerves Facial (VII)] : face symmetrical [Cranial Nerves Vestibulocochlear (VIII)] : hearing was intact bilaterally [Cranial Nerves Glossopharyngeal (IX)] : tongue and palate midline [Cranial Nerves Accessory (XI - Cranial And Spinal)] : head turning and shoulder shrug symmetric [Cranial Nerves Hypoglossal (XII)] : there was no tongue deviation with protrusion [Sensation Tactile Decrease] : light touch was intact [1+] : Ankle jerk left 1+ [Short Term Intact] : short term memory impaired [Plantar Reflex Right Only] : normal on the right [Plantar Reflex Left Only] : normal on the left

## 2019-04-09 ENCOUNTER — APPOINTMENT (OUTPATIENT)
Dept: INTERNAL MEDICINE | Facility: CLINIC | Age: 84
End: 2019-04-09
Payer: MEDICARE

## 2019-04-09 VITALS
DIASTOLIC BLOOD PRESSURE: 70 MMHG | SYSTOLIC BLOOD PRESSURE: 118 MMHG | WEIGHT: 206 LBS | BODY MASS INDEX: 27.3 KG/M2 | HEIGHT: 73 IN

## 2019-04-09 PROCEDURE — 99214 OFFICE O/P EST MOD 30 MIN: CPT

## 2019-04-09 RX ORDER — APIXABAN 5 MG/1
5 TABLET, FILM COATED ORAL TWICE DAILY
Refills: 0 | Status: DISCONTINUED | COMMUNITY
End: 2019-04-09

## 2019-04-09 RX ORDER — METOPROLOL SUCCINATE 200 MG/1
TABLET, EXTENDED RELEASE ORAL
Refills: 0 | Status: DISCONTINUED | COMMUNITY
End: 2019-04-09

## 2019-04-09 RX ORDER — LOSARTAN POTASSIUM 50 MG/1
50 TABLET, FILM COATED ORAL
Refills: 0 | Status: DISCONTINUED | COMMUNITY
End: 2019-04-09

## 2019-04-09 RX ORDER — ATORVASTATIN CALCIUM 80 MG/1
TABLET, FILM COATED ORAL
Refills: 0 | Status: DISCONTINUED | COMMUNITY
End: 2019-04-09

## 2019-04-09 RX ORDER — METFORMIN HYDROCHLORIDE 625 MG/1
TABLET ORAL
Refills: 0 | Status: DISCONTINUED | COMMUNITY
End: 2019-04-09

## 2019-04-09 NOTE — HISTORY OF PRESENT ILLNESS
[Spouse] : spouse [de-identified] : Pt here for f/u visit. He needs a form filled out for Adult day program. \par He recently had another MOH's procedure by Dr Ny. He has a mild hematoma on face in left cheek, that has not been spreading. \par Otherwise doing well. No complaints. His wife is present with him [FreeTextEntry1] : f/u visit\par form to be filled out for day program

## 2019-04-09 NOTE — PHYSICAL EXAM
[No Acute Distress] : no acute distress [Well Nourished] : well nourished [Well Developed] : well developed [Well-Appearing] : well-appearing [No Respiratory Distress] : no respiratory distress  [Clear to Auscultation] : lungs were clear to auscultation bilaterally [No Accessory Muscle Use] : no accessory muscle use [Normal Rate] : normal rate  [Normal S1, S2] : normal S1 and S2 [No Murmur] : no murmur heard [No Joint Swelling] : no joint swelling [Grossly Normal Strength/Tone] : grossly normal strength/tone [Normal Gait] : normal gait [Coordination Grossly Intact] : coordination grossly intact [No Focal Deficits] : no focal deficits [Normal Affect] : the affect was normal [Normal Mood] : the mood was normal [de-identified] : small 1 cm hematoma on left lower face in cheek [de-identified] : irreg at present [de-identified] : walks unassisted [de-identified] : apparent memory issues

## 2019-04-09 NOTE — HEALTH RISK ASSESSMENT
[No falls in past year] : Patient reported no falls in the past year [0] : 2) Feeling down, depressed, or hopeless: Not at all (0) [] : No [de-identified] : none [de-identified] : physical therapy, walking

## 2019-04-09 NOTE — REVIEW OF SYSTEMS
[Easy Bleeding] : easy bleeding [Negative] : Gastrointestinal [de-identified] : due to elqiuis, hematoma on face from MOH's procedure.  [FreeTextEntry9] : right shoulder pain intermittently.

## 2019-04-29 ENCOUNTER — RX RENEWAL (OUTPATIENT)
Age: 84
End: 2019-04-29

## 2019-05-13 ENCOUNTER — APPOINTMENT (OUTPATIENT)
Dept: CARDIOLOGY | Facility: CLINIC | Age: 84
End: 2019-05-13

## 2019-05-21 ENCOUNTER — APPOINTMENT (OUTPATIENT)
Dept: PODIATRY | Facility: CLINIC | Age: 84
End: 2019-05-21
Payer: MEDICARE

## 2019-05-21 VITALS
WEIGHT: 206 LBS | SYSTOLIC BLOOD PRESSURE: 110 MMHG | HEIGHT: 73 IN | DIASTOLIC BLOOD PRESSURE: 70 MMHG | BODY MASS INDEX: 27.3 KG/M2

## 2019-05-21 PROCEDURE — 11721 DEBRIDE NAIL 6 OR MORE: CPT

## 2019-05-21 NOTE — PHYSICAL EXAM
[Right Foot Was Examined] : The right foot was examined [Left Foot Was Examined] : The left foot was examined [Normal Appearance] : normal in appearance [Delayed in the Right Toes] : delayed in the toes [Normal in Appearance] : normal in appearance [Tenderness] : tender [Delayed in the Left Toes] : delayed in the toes [1+] : 1+ in the dorsalis pedis [Diminished Throughout Right Foot] : diminished tactile sensation with monofilament testing throughout right foot [Diminished Throughout Left Foot] : diminished tactile sensation with monofilament testing throughout left foot [Vibration Dec.] : normal vibratory sensation at the level of the toes [Full ROM] : with limited range of motion [Swelling] : not swollen [Erythema] : not erythematous [Position Sense Dec.] : normal position sense at the level of the toes [FreeTextEntry1] : The patient has all contributing factors to onychomycosis including but not limited to thickness, subungual debris, discoloration and partial lysis and they are brittle when cut.

## 2019-05-21 NOTE — PROCEDURE
[FreeTextEntry1] : Using sterile instrumentation debridement of all nails manually and electrically to decrease thickness, pain and girth and make shoe gear more comfortable with "slant back" procedure of any bordering spicules causing pain\par I have had a lengthy discussion with the patient regarding overall skincare. The importance of the type of socks, the type of shoes, and the type of overall foot hygiene is important to help control and prevent eruptions especially over extreme weather changes. This included but was not limited to hydration and lubrication, dove soap, triple rinse clothing and linens. I also explained the importance of thorough drying of both feet especially the web spaces. Given the extreme temperatures back in a car I also reviewed the type of shoes that would help reduce the chances of cracking of the skin especially leading to fissuring of the heels. Overall skincare precautions were reviewed education literature dispensed in the patient's questions asked and answered appropriately.\par \par \par

## 2019-07-09 ENCOUNTER — APPOINTMENT (OUTPATIENT)
Dept: CARDIOLOGY | Facility: CLINIC | Age: 84
End: 2019-07-09
Payer: MEDICARE

## 2019-07-09 VITALS
OXYGEN SATURATION: 94 % | BODY MASS INDEX: 26.52 KG/M2 | WEIGHT: 201 LBS | HEART RATE: 76 BPM | SYSTOLIC BLOOD PRESSURE: 108 MMHG | DIASTOLIC BLOOD PRESSURE: 52 MMHG

## 2019-07-09 PROCEDURE — 99215 OFFICE O/P EST HI 40 MIN: CPT

## 2019-07-09 PROCEDURE — 93000 ELECTROCARDIOGRAM COMPLETE: CPT

## 2019-07-09 NOTE — REVIEW OF SYSTEMS
[Feeling Fatigued] : feeling fatigued [Eyeglasses] : currently wearing eyeglasses [Loss Of Hearing] : hearing loss [Joint Pain] : joint pain [see HPI] : see HPI [Negative] : Heme/Lymph

## 2019-07-14 ENCOUNTER — NON-APPOINTMENT (OUTPATIENT)
Age: 84
End: 2019-07-14

## 2019-07-14 NOTE — DISCUSSION/SUMMARY
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 Boby has had atrial fibrillation since 1988. Prior attempts at electrocardioversion were unsuccessful. Adequate rate control of the ventricular response is achieved with the present medical regimen. A 3/14 Holter monitor demonstrated atrial fibrillation with an average ventricular rate of 64/minute. A 3.2 second pause without symptom correlation was noted. Lanoxin therapy administered at that time was discontinued. An elevated "[id number]" score is indicative of an increased risk of systemic/cerebral  emboli. .Prior  treatment with warfarin was discontinued due to concerns for falling/bleeding. However in 1/19 Apixaban was instituted following cardioembolic bilateral occipital strokes.\par \par I have recommended the following:\par 1 continue present medical regimen\par 2 no further cardiac testing for this problem at this time\par \par \par Hypertension\par Hypertension is controlled on the present medical regimen. In the setting of diabetes atrial fibrillation  and probable atherosclerotic heart disease, beta blocker and Ace-I./ARB therapy are attractive. The dose of losartan  may be increased if required to maintain optimal levels\par \par I have recommended the following:\par 1 continue the present medical regimen\par 2 increase dose losartan if required to maintain optimal levels as discussed above.\par \par \par Valvular heart disease\par The 1/19 echocardiogram demonstrated mild mitral insufficiency aortic valve sclerosis and mild aortic insufficiency. Mild-moderate tricuspid insufficiency was also noted. Moderate pulmonary hypertension was reflected by pulmonary artery systolic pressure of 45 mmHg. The left atrial volume was markedly increased at 122 mL/m2. The left ventricle ejection fraction was 60%. The present cardiac physical examination is not suggestive of severe mitral/aortic regurgitation. In view of the lack of symptoms, absence of heart failure and clinical course continued monitoring without intervention is indicated at this time.\par \par I have recommended the following:\par 1 no further cardiac testing for this problem at this time\par I\par \par \par Hyperlipidemia\par Hyperlipidemia represents a risk factor for the development of atherosclerotic heart disease. However there is no evidence of such to date. A 3/11  stress sestamibi study was normal. The left ventricle ejection fraction was 56% . The resting 11/18 electrocardiogram shows no evidence of myocardial ischemia or infarction. Now with a history of a stroke (Despite a cardioembolic etiology) the target LDL level is about 70.. HMG CoA inhibitor therapy has been effective. In 1/19  the  serum cholesterol level was 135  HDL 56  LDL 64 and triglycerides 72. . Non-pharmacological therapy, specifically diet and exercise are emphasized as major aspects of treatment.\par \par I have recommended the following:\par 1 low-salt low-fat low-cholesterol diet. Regular aerobic exercise\par 2 continue present medical regimen\par 3 target LDL level to about 100 as discussed above\par \par \par \par Dementia\par The working diagnosis is vascular dementia. In the setting of chronic atrial fibrillation multi-infarct dementia is a likely etiology. This problem represents the patient's major health threat\par .Further deterioration of mental and functional status is anticipated in the future with requirements for increasing social service help.\par \par I have recommended  the following:\par 1. Workup and treatment as directed by internal medicine

## 2019-07-14 NOTE — HISTORY OF PRESENT ILLNESS
[FreeTextEntry1] : 85-year-old man\par Routine followup\par No symptoms. Mr. Mir specifically denies chest pain palpitations, dizziness shortness of breath or syncope.\par \par Boby is accompanied today by his wife who is the primary caregiver

## 2019-07-14 NOTE — PHYSICAL EXAM
[Normal Conjunctiva] : the conjunctiva exhibited no abnormalities [Auscultation Breath Sounds / Voice Sounds] : lungs were clear to auscultation bilaterally [Heart Sounds] : normal S1 and S2 [Murmurs] : no murmurs present [Bowel Sounds] : normal bowel sounds [Abdomen Tenderness] : non-tender [Abdomen Soft] : soft [Abnormal Walk] : normal gait [Cyanosis, Localized] : no localized cyanosis [] : no rash [Affect] : the affect was normal [FreeTextEntry1] : pleasant but forgetful

## 2019-07-22 ENCOUNTER — RX RENEWAL (OUTPATIENT)
Age: 84
End: 2019-07-22

## 2019-07-26 ENCOUNTER — APPOINTMENT (OUTPATIENT)
Dept: PODIATRY | Facility: CLINIC | Age: 84
End: 2019-07-26
Payer: MEDICARE

## 2019-07-26 VITALS
BODY MASS INDEX: 26.64 KG/M2 | DIASTOLIC BLOOD PRESSURE: 80 MMHG | HEIGHT: 73 IN | WEIGHT: 201 LBS | SYSTOLIC BLOOD PRESSURE: 118 MMHG

## 2019-07-26 PROCEDURE — 11721 DEBRIDE NAIL 6 OR MORE: CPT

## 2019-07-26 NOTE — PHYSICAL EXAM
[Right Foot Was Examined] : The right foot was examined [Left Foot Was Examined] : The left foot was examined [Normal Appearance] : normal in appearance [Delayed in the Right Toes] : delayed in the toes [Normal in Appearance] : normal in appearance [Full ROM] : with limited range of motion [Swelling] : not swollen [Tenderness] : tender [Erythema] : not erythematous [Delayed in the Left Toes] : delayed in the toes [1+] : 1+ in the dorsalis pedis [Vibration Dec.] : diminished vibratory sensation at the level of the toes [Position Sense Dec.] : normal position sense at the level of the toes [Diminished Throughout Right Foot] : diminished tactile sensation with monofilament testing throughout right foot [Diminished Throughout Left Foot] : diminished tactile sensation with monofilament testing throughout left foot [FreeTextEntry1] : The patient has all contributing factors to onychomycosis including but not limited to thickness, subungual debris, discoloration and partial lysis and they are brittle when cut.

## 2019-08-07 ENCOUNTER — RX RENEWAL (OUTPATIENT)
Age: 84
End: 2019-08-07

## 2019-08-20 ENCOUNTER — APPOINTMENT (OUTPATIENT)
Dept: INTERNAL MEDICINE | Facility: CLINIC | Age: 84
End: 2019-08-20
Payer: MEDICARE

## 2019-08-20 VITALS
SYSTOLIC BLOOD PRESSURE: 126 MMHG | BODY MASS INDEX: 26.9 KG/M2 | DIASTOLIC BLOOD PRESSURE: 66 MMHG | WEIGHT: 203 LBS | HEIGHT: 73 IN

## 2019-08-20 PROCEDURE — G0439: CPT

## 2019-08-20 PROCEDURE — 36415 COLL VENOUS BLD VENIPUNCTURE: CPT

## 2019-08-20 RX ORDER — RIVASTIGMINE 9.5 MG/24H
9.5 PATCH, EXTENDED RELEASE TRANSDERMAL
Refills: 0 | Status: ACTIVE | COMMUNITY

## 2019-08-20 RX ORDER — LOSARTAN POTASSIUM 50 MG/1
50 TABLET, FILM COATED ORAL
Qty: 90 | Refills: 3 | Status: DISCONTINUED | COMMUNITY
Start: 2019-02-04 | End: 2019-08-20

## 2019-08-20 RX ORDER — QUETIAPINE FUMARATE 25 MG/1
25 TABLET ORAL
Qty: 30 | Refills: 0 | Status: DISCONTINUED | COMMUNITY
Start: 2019-01-18 | End: 2019-08-20

## 2019-08-20 RX ORDER — INGENOL MEBUTATE 500 UG/G
0.05 GEL TOPICAL
Qty: 2 | Refills: 0 | Status: DISCONTINUED | COMMUNITY
Start: 2019-02-19 | End: 2019-08-20

## 2019-08-20 RX ORDER — DOCUSATE SODIUM 100 MG/1
100 CAPSULE, LIQUID FILLED ORAL
Refills: 0 | Status: DISCONTINUED | COMMUNITY
End: 2019-08-20

## 2019-08-20 RX ORDER — MEMANTINE HYDROCHLORIDE 28 MG/1
28 CAPSULE, EXTENDED RELEASE ORAL
Refills: 0 | Status: ACTIVE | COMMUNITY

## 2019-08-20 NOTE — PHYSICAL EXAM
[No Acute Distress] : no acute distress [Well Nourished] : well nourished [Well Developed] : well developed [Well-Appearing] : well-appearing [Normal Sclera/Conjunctiva] : normal sclera/conjunctiva [EOMI] : extraocular movements intact [Normal TMs] : both tympanic membranes were normal [No Respiratory Distress] : no respiratory distress  [No Accessory Muscle Use] : no accessory muscle use [Clear to Auscultation] : lungs were clear to auscultation bilaterally [Normal Rate] : normal rate  [Normal S1, S2] : normal S1 and S2 [No Murmur] : no murmur heard [No Edema] : there was no peripheral edema [Soft] : abdomen soft [Non Tender] : non-tender [Non-distended] : non-distended [No Masses] : no abdominal mass palpated [Normal Bowel Sounds] : normal bowel sounds [No CVA Tenderness] : no CVA  tenderness [No Spinal Tenderness] : no spinal tenderness [No Joint Swelling] : no joint swelling [Grossly Normal Strength/Tone] : grossly normal strength/tone [No Rash] : no rash [Normal Gait] : normal gait [Coordination Grossly Intact] : coordination grossly intact [No Focal Deficits] : no focal deficits [Speech Grossly Normal] : speech grossly normal [Normal Affect] : the affect was normal [Normal Mood] : the mood was normal [de-identified] : irreg rate currently

## 2019-08-20 NOTE — HISTORY OF PRESENT ILLNESS
[PMH Reviewed and Updated] : past medical history reviewed and updated [PSH Reviewed and Updated] : past surgical history reviewed and updated [Family History Reviewed and Updated] : family history reviewed and updated [Medication and Allergies Reconciled] : medication and allergies reconciled [0] : 0 [Retired] : retired from work [None] : The patient has no concerns about alcohol abuse [Walking] : walking [Compliant with medications] : compliant with medications [Extended Family] : extended family [Needs some help with ADLs] : need some help with ADLs [Does not drive] : does not drive [History of falls] : history of falls [Seatbelts] : seatbelts [Smoke Detectors] : smoke detectors [Carbon Monoxide Detector] : carbon monoxide detector [Hot Water Temp <120F] : hot water temp <120F [Fall Prevention Measures] : fall prevention measures [Bathroom Grab Bars] : bathroom grab bars [Patient Has Documented Advanced Directive/Health Proxy but did not bring paperwork to Office Visit] : Patient has documented Advanced Directive/Health Proxy but did not bring paperwork to office visit [Spouse] : spouse [de-identified] : Pt here for medicare wellness visit. His wife is present with him. Overall he is feeling well. He has no chest pain, but at times when walking he has to stop and catch his breath. His last echo was 1/2019. \par Pt's memory issues seem to be stable. As per wife she is unsure if meds are helping but at least his dementia does not seem to be progressing. \par  [Over the Past 2 Weeks, Have You Felt Down, Depressed, or Hopeless?] : 1.) Over the past 2 weeks, have you felt down, depressed, or hopeless? No [Over the Past 2 Weeks, Have You Felt Little Interest or Pleasure Doing Things?] : 2.) Over the past 2 weeks, have you felt little interest or pleasure doing things? No [Bicycle Helmet] : not using bicycle helmet [Safe Driving Habits] : not using safe driving habits [Motorcycle Helmet] : not using motorcycle helmet [Gun Trigger Locks] : not using gun trigger locks [Gun Safe] : not using a gun safe [Sunscreen] : not using sunscreen [CPR Training for Household] : did not receive CPR training for patient [CPR Training for Patient] : did not receive CPR training for patient [de-identified] : none [FreeTextEntry1] : none [de-identified] : none

## 2019-08-20 NOTE — HEALTH RISK ASSESSMENT
[0] : 2) Feeling down, depressed, or hopeless: Not at all (0) [Fully functional (bathing, dressing, toileting, transferring, walking, feeding)] : Fully functional (bathing, dressing, toileting, transferring, walking, feeding) [Patient reported colonoscopy was normal] : Patient reported colonoscopy was normal [ColonoscopyDate] : 01/13 [ColonoscopyComments] : Dr Titus

## 2019-08-20 NOTE — REVIEW OF SYSTEMS
[Dyspnea on Exertion] : dyspnea on exertion [Nocturia] : nocturia [Frequency] : frequency [Negative] : Heme/Lymph [Palpitations] : no palpitations [Chest Pain] : no chest pain [Lower Ext Edema] : no lower extremity edema [Orthopena] : no orthopnea [Shortness Of Breath] : no shortness of breath [Wheezing] : no wheezing [Cough] : no cough [Dysuria] : no dysuria [Hematuria] : no hematuria

## 2019-08-21 ENCOUNTER — RX RENEWAL (OUTPATIENT)
Age: 84
End: 2019-08-21

## 2019-08-21 ENCOUNTER — OTHER (OUTPATIENT)
Age: 84
End: 2019-08-21

## 2019-08-21 LAB
ALBUMIN SERPL ELPH-MCNC: 4.1 G/DL
ALP BLD-CCNC: 59 U/L
ALT SERPL-CCNC: 9 U/L
ANION GAP SERPL CALC-SCNC: 14 MMOL/L
AST SERPL-CCNC: 16 U/L
BASOPHILS # BLD AUTO: 0.02 K/UL
BASOPHILS NFR BLD AUTO: 0.2 %
BILIRUB SERPL-MCNC: 0.9 MG/DL
BUN SERPL-MCNC: 15 MG/DL
CALCIUM SERPL-MCNC: 10.2 MG/DL
CHLORIDE SERPL-SCNC: 103 MMOL/L
CHOLEST SERPL-MCNC: 124 MG/DL
CHOLEST/HDLC SERPL: 2.5 RATIO
CO2 SERPL-SCNC: 24 MMOL/L
CREAT SERPL-MCNC: 1.09 MG/DL
EOSINOPHIL # BLD AUTO: 0.07 K/UL
EOSINOPHIL NFR BLD AUTO: 0.9 %
ESTIMATED AVERAGE GLUCOSE: 137 MG/DL
FERRITIN SERPL-MCNC: 72 NG/ML
FOLATE SERPL-MCNC: >20 NG/ML
GLUCOSE SERPL-MCNC: 105 MG/DL
HBA1C MFR BLD HPLC: 6.4 %
HCT VFR BLD CALC: 37.8 %
HDLC SERPL-MCNC: 50 MG/DL
HGB BLD-MCNC: 11.7 G/DL
IMM GRANULOCYTES NFR BLD AUTO: 0.2 %
LDLC SERPL CALC-MCNC: 62 MG/DL
LYMPHOCYTES # BLD AUTO: 0.93 K/UL
LYMPHOCYTES NFR BLD AUTO: 11.3 %
MAN DIFF?: NORMAL
MCHC RBC-ENTMCNC: 31 GM/DL
MCHC RBC-ENTMCNC: 31.5 PG
MCV RBC AUTO: 101.6 FL
MONOCYTES # BLD AUTO: 0.76 K/UL
MONOCYTES NFR BLD AUTO: 9.3 %
NEUTROPHILS # BLD AUTO: 6.4 K/UL
NEUTROPHILS NFR BLD AUTO: 78.1 %
PLATELET # BLD AUTO: 185 K/UL
POTASSIUM SERPL-SCNC: 4.5 MMOL/L
PROT SERPL-MCNC: 6.7 G/DL
RBC # BLD: 3.72 M/UL
RBC # FLD: 14.6 %
SODIUM SERPL-SCNC: 141 MMOL/L
TRIGL SERPL-MCNC: 61 MG/DL
TSH SERPL-ACNC: 2.72 UIU/ML
VIT B12 SERPL-MCNC: 408 PG/ML
WBC # FLD AUTO: 8.2 K/UL

## 2019-08-23 LAB
IRON SATN MFR SERPL: 22 %
IRON SERPL-MCNC: 60 UG/DL
TIBC SERPL-MCNC: 272 UG/DL
UIBC SERPL-MCNC: 212 UG/DL

## 2019-09-05 ENCOUNTER — APPOINTMENT (OUTPATIENT)
Dept: HEMATOLOGY ONCOLOGY | Facility: CLINIC | Age: 84
End: 2019-09-05
Payer: MEDICARE

## 2019-09-05 ENCOUNTER — RESULT REVIEW (OUTPATIENT)
Age: 84
End: 2019-09-05

## 2019-09-05 VITALS
DIASTOLIC BLOOD PRESSURE: 71 MMHG | HEART RATE: 62 BPM | HEIGHT: 72.99 IN | WEIGHT: 204 LBS | BODY MASS INDEX: 27.04 KG/M2 | TEMPERATURE: 98.3 F | SYSTOLIC BLOOD PRESSURE: 121 MMHG | OXYGEN SATURATION: 93 % | RESPIRATION RATE: 16 BRPM

## 2019-09-05 PROCEDURE — 99205 OFFICE O/P NEW HI 60 MIN: CPT

## 2019-09-05 NOTE — ASSESSMENT
[FreeTextEntry1] : Macrocytic anemia\par \par Differential diagnosis discussed\par B12, folate, TSH negative\par Send additional blood work including MMA, hemolysis work  up, myeloma panel\par Ultrasound abdomen to look for hepatosplenomegaly\par \par Follow up in 3 weeks. NO labs on follow up

## 2019-09-05 NOTE — HISTORY OF PRESENT ILLNESS
[de-identified] : Mr. Boby Mir is 85 year old male who is referred by Dr. Celestin for initial consult for macrocytic anemia.\par \par Pt with hx of A. Fib, HTN, BPH,  Type II diabetes with diabetic polyneuropathy, dementia, CVA in Januray 2019, on Eliquis 5mg bid who was advised to see hematologist for continual decreased of his hgb in the last few years. Ferritin, vitamin B12, and folate were tested with normal limits. Patient has no associated signs or symtoms. \par \par His last blood test on 8/20/2019  Hgb 11.7 Hct - 37.8 .6  Plts 185, Ferritin - 72. vitamin B12 - 408, Folate > 20.0, TSH 2.72\par \par Last normal colonoscopy was in 2013 \par no personality or family  history of hematological or oncological disoder\par \par No fevers chills night sweats\par No fatigue, tiredness, apathy\par No appetite loss or weight loss/weight gain\par No chest pain, shortness of breath, palpitation, dizziness\par No abdominal pain, nausea, vomiting, diarrhea, constipation\par No bright red blood per rectum, hematemesis or melena\par No hematuria, dysuria, frequency, urgency\par No headaches, visual changes, focal weakness, tingling, numbness\par \par \par

## 2019-09-05 NOTE — CONSULT LETTER
[Dear  ___] : Dear  [unfilled], [Please see my note below.] : Please see my note below. [Consult Letter:] : I had the pleasure of evaluating your patient, [unfilled]. [Consult Closing:] : Thank you very much for allowing me to participate in the care of this patient.  If you have any questions, please do not hesitate to contact me. [Sincerely,] : Sincerely, [FreeTextEntry3] : Osei Negrete MD, MPH\par Attending Physician\par Hematology Oncology\par [facility] [place]\par [facility]\par

## 2019-09-12 ENCOUNTER — RESULT REVIEW (OUTPATIENT)
Age: 84
End: 2019-09-12

## 2019-09-26 ENCOUNTER — APPOINTMENT (OUTPATIENT)
Dept: HEMATOLOGY ONCOLOGY | Facility: CLINIC | Age: 84
End: 2019-09-26
Payer: MEDICARE

## 2019-09-26 VITALS
HEART RATE: 69 BPM | RESPIRATION RATE: 20 BRPM | DIASTOLIC BLOOD PRESSURE: 64 MMHG | SYSTOLIC BLOOD PRESSURE: 107 MMHG | TEMPERATURE: 98.4 F | OXYGEN SATURATION: 95 % | HEIGHT: 72.95 IN | WEIGHT: 204 LBS | BODY MASS INDEX: 27.04 KG/M2

## 2019-09-26 PROCEDURE — 99214 OFFICE O/P EST MOD 30 MIN: CPT

## 2019-09-26 NOTE — ASSESSMENT
[FreeTextEntry1] : Normocytic to Macrocytic anemia\par \par B12, folate, TSH negative\par Additional blood work including MMA, hemolysis work  up, myeloma panel- also unremarkable\par Ultrasound abdomen shows no hepatosplenomegaly\par Ferritin 50s- advised to start oral iron once a day\par \par Follow up in 3 monhts. CBC, CMP, ferritin few days prior to the appointmnet

## 2019-09-26 NOTE — HISTORY OF PRESENT ILLNESS
[de-identified] : Mr. Boby Mir is 85 year old male who is referred by Dr. Celestin for initial consult for macrocytic anemia.\par \par Pt with hx of A. Fib, HTN, BPH,  Type II diabetes with diabetic polyneuropathy, dementia, CVA in Januray 2019, on Eliquis 5mg bid who was advised to see hematologist for continual decreased of his hgb in the last few years. Ferritin, vitamin B12, and folate were tested with normal limits. Patient has no associated signs or symtoms. \par \par His last blood test on 8/20/2019  Hgb 11.7 Hct - 37.8 .6  Plts 185, Ferritin - 72. vitamin B12 - 408, Folate > 20.0, TSH 2.72\par \par Last normal colonoscopy was in 2013 \par no personality or family  history of hematological or oncological disoder\par \par No fevers chills night sweats\par No fatigue, tiredness, apathy\par No appetite loss or weight loss/weight gain\par No chest pain, shortness of breath, palpitation, dizziness\par No abdominal pain, nausea, vomiting, diarrhea, constipation\par No bright red blood per rectum, hematemesis or melena\par No hematuria, dysuria, frequency, urgency\par No headaches, visual changes, focal weakness, tingling, numbness\par \par \par  [de-identified] : HE is seen today for follow up\par \par No new symptoms\par Energy levels stable\par

## 2019-09-26 NOTE — CONSULT LETTER
[Dear  ___] : Dear  [unfilled], [Please see my note below.] : Please see my note below. [Consult Closing:] : Thank you very much for allowing me to participate in the care of this patient.  If you have any questions, please do not hesitate to contact me. [Sincerely,] : Sincerely, [Courtesy Letter:] : I had the pleasure of seeing your patient, [unfilled], in my office today. [FreeTextEntry3] : Osei Negrete MD, MPH\par Attending Physician\par Hematology Oncology\par [facility] [place]\par [facility]\par

## 2019-09-26 NOTE — RESULTS/DATA
[FreeTextEntry1] : Labs reviewed and discussed\par Ferritin 50s\par \par SPEP/IFx/FLCR- negative\par Haptoglobin, retic, LDH, Emiliana- negative

## 2019-11-07 ENCOUNTER — APPOINTMENT (OUTPATIENT)
Dept: PODIATRY | Facility: CLINIC | Age: 84
End: 2019-11-07
Payer: MEDICARE

## 2019-11-07 VITALS
SYSTOLIC BLOOD PRESSURE: 110 MMHG | BODY MASS INDEX: 27.63 KG/M2 | WEIGHT: 204 LBS | HEIGHT: 72 IN | DIASTOLIC BLOOD PRESSURE: 70 MMHG

## 2019-11-07 PROCEDURE — 11721 DEBRIDE NAIL 6 OR MORE: CPT

## 2019-11-07 NOTE — PHYSICAL EXAM
[Right Foot Was Examined] : The right foot was examined [Normal Appearance] : normal in appearance [Left Foot Was Examined] : The left foot was examined [Delayed in the Right Toes] : delayed in the toes [Normal in Appearance] : normal in appearance [Tenderness] : tender [Delayed in the Left Toes] : delayed in the toes [1+] : 1+ in the dorsalis pedis [Diminished Throughout Right Foot] : diminished tactile sensation with monofilament testing throughout right foot [Diminished Throughout Left Foot] : diminished tactile sensation with monofilament testing throughout left foot [Vibration Dec.] : normal vibratory sensation at the level of the toes [Full ROM] : with limited range of motion [Swelling] : not swollen [Erythema] : not erythematous [FreeTextEntry1] : The patient has all contributing factors to onychomycosis including but not limited to thickness, subungual debris, discoloration and partial lysis and they are brittle when cut. [Position Sense Dec.] : normal position sense at the level of the toes

## 2019-11-07 NOTE — PROCEDURE
[FreeTextEntry1] : Using sterile instrumentation debridement of all nails manually and electrically to decrease thickness, pain and girth and make shoe gear more comfortable with "slant back" procedure of any bordering spicules causing pain\par I have had a lengthy discussion with the patient regarding overall skincare. The importance of the type of socks, the type of shoes, and the type of overall foot hygiene is important to help control and prevent eruptions especially over extreme weather changes. This included but was not limited to hydration and lubrication, dove soap, triple rinse clothing and linens. I also explained the importance of thorough drying of both feet especially the web spaces. Given the extreme temperatures back in a car I also reviewed the type of shoes that would help reduce the chances of cracking of the skin especially leading to fissuring of the heels. Overall skincare precautions were reviewed education literature dispensed in the patient's questions asked and answered appropriately.\par

## 2019-11-11 ENCOUNTER — RX RENEWAL (OUTPATIENT)
Age: 84
End: 2019-11-11

## 2019-12-12 ENCOUNTER — APPOINTMENT (OUTPATIENT)
Dept: INTERNAL MEDICINE | Facility: CLINIC | Age: 84
End: 2019-12-12
Payer: MEDICARE

## 2019-12-12 VITALS
DIASTOLIC BLOOD PRESSURE: 66 MMHG | WEIGHT: 206 LBS | HEIGHT: 72 IN | SYSTOLIC BLOOD PRESSURE: 122 MMHG | BODY MASS INDEX: 27.9 KG/M2

## 2019-12-12 PROCEDURE — 36415 COLL VENOUS BLD VENIPUNCTURE: CPT

## 2019-12-12 PROCEDURE — 99214 OFFICE O/P EST MOD 30 MIN: CPT | Mod: 25

## 2019-12-12 RX ORDER — TAMSULOSIN HYDROCHLORIDE 0.4 MG/1
0.4 CAPSULE ORAL
Refills: 0 | Status: DISCONTINUED | COMMUNITY
End: 2019-12-12

## 2019-12-12 RX ORDER — ZOSTER VACCINE RECOMBINANT, ADJUVANTED 50 MCG/0.5
50 KIT INTRAMUSCULAR
Qty: 1 | Refills: 1 | Status: DISCONTINUED | COMMUNITY
Start: 2019-08-20 | End: 2019-12-12

## 2019-12-12 RX ORDER — FERROUS SULFATE 325(65) MG
324 TABLET ORAL
Refills: 0 | Status: ACTIVE | COMMUNITY

## 2019-12-12 NOTE — HEALTH RISK ASSESSMENT
[No] : In the past 12 months have you used drugs other than those required for medical reasons? No [No falls in past year] : Patient reported no falls in the past year [0] : 2) Feeling down, depressed, or hopeless: Not at all (0) [] : No [de-identified] : none [de-identified] : physical therapy

## 2019-12-12 NOTE — HISTORY OF PRESENT ILLNESS
[Spouse] : spouse [FreeTextEntry1] : follow-up  [de-identified] : Pt here with his wife for f/u visit. As per pt he feels well. He has dementia which has been stable. His wife states that all has been well. He has seen the hematologist and is on iron, folate and vit B supps. \par Pt has not complain nof any chest pains or SOB. No palpitations.\par No recent falls.

## 2019-12-12 NOTE — PHYSICAL EXAM
[No JVD] : no jugular venous distention [Normal] : no respiratory distress, lungs were clear to auscultation bilaterally and no accessory muscle use [Supple] : supple [Normal Rate] : normal rate  [No Murmur] : no murmur heard [No Focal Deficits] : no focal deficits [Normal Mood] : the mood was normal [Speech Grossly Normal] : speech grossly normal [de-identified] : irreg [de-identified] : AAO x 2

## 2019-12-15 LAB
PSA FREE FLD-MCNC: 24 %
PSA FREE SERPL-MCNC: 1.1 NG/ML
PSA SERPL-MCNC: 4.54 NG/ML

## 2020-01-01 ENCOUNTER — RESULT REVIEW (OUTPATIENT)
Age: 85
End: 2020-01-01

## 2020-01-01 ENCOUNTER — APPOINTMENT (OUTPATIENT)
Dept: INTERNAL MEDICINE | Facility: CLINIC | Age: 85
End: 2020-01-01
Payer: MEDICARE

## 2020-01-01 ENCOUNTER — NON-APPOINTMENT (OUTPATIENT)
Age: 85
End: 2020-01-01

## 2020-01-01 ENCOUNTER — APPOINTMENT (OUTPATIENT)
Dept: HEMATOLOGY ONCOLOGY | Facility: CLINIC | Age: 85
End: 2020-01-01
Payer: MEDICARE

## 2020-01-01 ENCOUNTER — APPOINTMENT (OUTPATIENT)
Dept: PODIATRY | Facility: CLINIC | Age: 85
End: 2020-01-01
Payer: MEDICARE

## 2020-01-01 ENCOUNTER — RX RENEWAL (OUTPATIENT)
Age: 85
End: 2020-01-01

## 2020-01-01 VITALS
HEART RATE: 65 BPM | OXYGEN SATURATION: 98 % | SYSTOLIC BLOOD PRESSURE: 130 MMHG | BODY MASS INDEX: 29.12 KG/M2 | TEMPERATURE: 97.1 F | DIASTOLIC BLOOD PRESSURE: 78 MMHG | RESPIRATION RATE: 16 BRPM | WEIGHT: 208 LBS | HEIGHT: 71 IN

## 2020-01-01 VITALS
BODY MASS INDEX: 26.41 KG/M2 | DIASTOLIC BLOOD PRESSURE: 68 MMHG | SYSTOLIC BLOOD PRESSURE: 122 MMHG | HEIGHT: 71.97 IN | WEIGHT: 195 LBS

## 2020-01-01 VITALS
DIASTOLIC BLOOD PRESSURE: 75 MMHG | SYSTOLIC BLOOD PRESSURE: 127 MMHG | OXYGEN SATURATION: 95 % | BODY MASS INDEX: 28.77 KG/M2 | RESPIRATION RATE: 18 BRPM | HEART RATE: 62 BPM | TEMPERATURE: 97.6 F | WEIGHT: 205.5 LBS | HEIGHT: 71 IN

## 2020-01-01 VITALS
DIASTOLIC BLOOD PRESSURE: 70 MMHG | WEIGHT: 195 LBS | SYSTOLIC BLOOD PRESSURE: 120 MMHG | TEMPERATURE: 98.1 F | BODY MASS INDEX: 27.3 KG/M2 | HEIGHT: 71 IN

## 2020-01-01 VITALS
SYSTOLIC BLOOD PRESSURE: 118 MMHG | BODY MASS INDEX: 26.41 KG/M2 | TEMPERATURE: 97.7 F | OXYGEN SATURATION: 99 % | HEART RATE: 60 BPM | HEIGHT: 71.97 IN | WEIGHT: 195 LBS | DIASTOLIC BLOOD PRESSURE: 47 MMHG | RESPIRATION RATE: 18 BRPM

## 2020-01-01 VITALS — BODY MASS INDEX: 27.3 KG/M2 | HEIGHT: 71 IN | WEIGHT: 195 LBS

## 2020-01-01 DIAGNOSIS — Z71.89 OTHER SPECIFIED COUNSELING: ICD-10-CM

## 2020-01-01 DIAGNOSIS — Z87.898 PERSONAL HISTORY OF OTHER SPECIFIED CONDITIONS: ICD-10-CM

## 2020-01-01 LAB
CHOLEST SERPL-MCNC: 105 MG/DL
CHOLEST/HDLC SERPL: 2.2 RATIO
ESTIMATED AVERAGE GLUCOSE: 128 MG/DL
HBA1C MFR BLD HPLC: 6.1 %
HDLC SERPL-MCNC: 49 MG/DL
LDLC SERPL CALC-MCNC: 47 MG/DL
TRIGL SERPL-MCNC: 49 MG/DL

## 2020-01-01 PROCEDURE — 11721 DEBRIDE NAIL 6 OR MORE: CPT

## 2020-01-01 PROCEDURE — 36415 COLL VENOUS BLD VENIPUNCTURE: CPT

## 2020-01-01 PROCEDURE — 99214 OFFICE O/P EST MOD 30 MIN: CPT

## 2020-01-01 PROCEDURE — 99213 OFFICE O/P EST LOW 20 MIN: CPT

## 2020-01-01 PROCEDURE — G0439: CPT

## 2020-01-01 PROCEDURE — 99495 TRANSJ CARE MGMT MOD F2F 14D: CPT

## 2020-01-01 RX ORDER — TAMSULOSIN HYDROCHLORIDE 0.4 MG/1
0.4 CAPSULE ORAL
Qty: 90 | Refills: 3 | Status: ACTIVE | COMMUNITY
Start: 2019-02-13 | End: 1900-01-01

## 2020-01-01 RX ORDER — METOPROLOL SUCCINATE 50 MG/1
50 TABLET, EXTENDED RELEASE ORAL
Qty: 180 | Refills: 3 | Status: DISCONTINUED | COMMUNITY
Start: 2019-02-15 | End: 2020-01-01

## 2020-01-03 ENCOUNTER — APPOINTMENT (OUTPATIENT)
Dept: HEMATOLOGY ONCOLOGY | Facility: CLINIC | Age: 85
End: 2020-01-03
Payer: MEDICARE

## 2020-01-03 ENCOUNTER — RESULT REVIEW (OUTPATIENT)
Age: 85
End: 2020-01-03

## 2020-01-03 VITALS
OXYGEN SATURATION: 96 % | TEMPERATURE: 97.9 F | BODY MASS INDEX: 27.9 KG/M2 | DIASTOLIC BLOOD PRESSURE: 69 MMHG | WEIGHT: 206 LBS | RESPIRATION RATE: 18 BRPM | HEIGHT: 72 IN | SYSTOLIC BLOOD PRESSURE: 123 MMHG | HEART RATE: 62 BPM

## 2020-01-03 PROCEDURE — 99499A: CUSTOM | Mod: NC

## 2020-01-09 ENCOUNTER — APPOINTMENT (OUTPATIENT)
Dept: HEMATOLOGY ONCOLOGY | Facility: CLINIC | Age: 85
End: 2020-01-09
Payer: MEDICARE

## 2020-01-09 VITALS
OXYGEN SATURATION: 98 % | HEIGHT: 71.97 IN | WEIGHT: 207.7 LBS | RESPIRATION RATE: 16 BRPM | DIASTOLIC BLOOD PRESSURE: 64 MMHG | BODY MASS INDEX: 28.13 KG/M2 | HEART RATE: 66 BPM | TEMPERATURE: 98.1 F | SYSTOLIC BLOOD PRESSURE: 115 MMHG

## 2020-01-09 PROCEDURE — 99214 OFFICE O/P EST MOD 30 MIN: CPT

## 2020-01-09 NOTE — RESULTS/DATA
[FreeTextEntry1] : Labs reviewed and discussed\par \par \par SPEP/IFx/FLCR- negative\par Haptoglobin, retic, LDH, Emiliana- negative

## 2020-01-09 NOTE — ASSESSMENT
[FreeTextEntry1] : Normocytic to Macrocytic anemia\par \par B12, folate, TSH negative\par Additional blood work including MMA, hemolysis work  up, myeloma panel- also unremarkable\par Ultrasound abdomen shows no hepatosplenomegaly\par Ferritin 50s- improved to 70s with oral iron once a day\par Less inclined for bone marrow. \par \par Follow up in 3 months. CBC, CMP, ferritin few days prior to the appointment

## 2020-01-09 NOTE — HISTORY OF PRESENT ILLNESS
[de-identified] : Mr. Boby Mir is 85 year old male who is referred by Dr. Celestin for initial consult for macrocytic anemia.\par \par Pt with hx of A. Fib, HTN, BPH,  Type II diabetes with diabetic polyneuropathy, dementia, CVA in Januray 2019, on Eliquis 5mg bid who was advised to see hematologist for continual decreased of his hgb in the last few years. Ferritin, vitamin B12, and folate were tested with normal limits. Patient has no associated signs or symtoms. \par \par His last blood test on 8/20/2019  Hgb 11.7 Hct - 37.8 .6  Plts 185, Ferritin - 72. vitamin B12 - 408, Folate > 20.0, TSH 2.72\par \par Last normal colonoscopy was in 2013 \par no personality or family  history of hematological or oncological disoder\par \par No fevers chills night sweats\par No fatigue, tiredness, apathy\par No appetite loss or weight loss/weight gain\par No chest pain, shortness of breath, palpitation, dizziness\par No abdominal pain, nausea, vomiting, diarrhea, constipation\par No bright red blood per rectum, hematemesis or melena\par No hematuria, dysuria, frequency, urgency\par No headaches, visual changes, focal weakness, tingling, numbness\par \par \par  [de-identified] : HE is seen today for follow up\par \par No new symptoms\par Energy levels stable\par \par He is on oral iron pills once a day

## 2020-01-09 NOTE — CONSULT LETTER
[Please see my note below.] : Please see my note below. [Dear  ___] : Dear  [unfilled], [Courtesy Letter:] : I had the pleasure of seeing your patient, [unfilled], in my office today. [Consult Closing:] : Thank you very much for allowing me to participate in the care of this patient.  If you have any questions, please do not hesitate to contact me. [Sincerely,] : Sincerely, [FreeTextEntry3] : Osei Negrete MD, MPH\par Attending Physician\par Hematology Oncology\par [facility] [place]\par [facility]\par

## 2020-01-10 ENCOUNTER — APPOINTMENT (OUTPATIENT)
Dept: CARDIOLOGY | Facility: CLINIC | Age: 85
End: 2020-01-10
Payer: MEDICARE

## 2020-01-10 VITALS
DIASTOLIC BLOOD PRESSURE: 60 MMHG | WEIGHT: 206 LBS | OXYGEN SATURATION: 99 % | SYSTOLIC BLOOD PRESSURE: 116 MMHG | BODY MASS INDEX: 27.96 KG/M2 | HEART RATE: 60 BPM

## 2020-01-10 DIAGNOSIS — Z86.79 PERSONAL HISTORY OF OTHER DISEASES OF THE CIRCULATORY SYSTEM: ICD-10-CM

## 2020-01-10 DIAGNOSIS — Z86.69 PERSONAL HISTORY OF OTHER DISEASES OF THE NERVOUS SYSTEM AND SENSE ORGANS: ICD-10-CM

## 2020-01-10 PROCEDURE — 99215 OFFICE O/P EST HI 40 MIN: CPT

## 2020-01-10 NOTE — REVIEW OF SYSTEMS
[Eyeglasses] : currently wearing eyeglasses [Feeling Fatigued] : feeling fatigued [Loss Of Hearing] : hearing loss [see HPI] : see HPI [Joint Pain] : joint pain [Negative] : Endocrine

## 2020-01-11 NOTE — PHYSICAL EXAM
[Auscultation Breath Sounds / Voice Sounds] : lungs were clear to auscultation bilaterally [Normal Conjunctiva] : the conjunctiva exhibited no abnormalities [Murmurs] : no murmurs present [Heart Sounds] : normal S1 and S2 [Abdomen Tenderness] : non-tender [Abdomen Soft] : soft [Bowel Sounds] : normal bowel sounds [Cyanosis, Localized] : no localized cyanosis [Abnormal Walk] : normal gait [] : no rash [Affect] : the affect was normal [FreeTextEntry1] : pleasant but forgetful

## 2020-01-11 NOTE — DISCUSSION/SUMMARY
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 Boby has had atrial fibrillation since 1988. Prior attempts at electrocardioversion were unsuccessful. Adequate rate control of the ventricular response is achieved with the present medical regimen. A 3/14 Holter monitor demonstrated atrial fibrillation with an average ventricular rate of 64/minute. A 3.2 second pause without symptom correlation was noted. Lanoxin therapy administered at that time was discontinued. An elevated "[id number]" score is indicative of an increased risk of systemic/cerebral  emboli. .Prior  treatment with warfarin was discontinued due to concerns for falling/bleeding. However in 1/19 Apixaban was instituted following cardioembolic bilateral occipital strokes.\par \par I have recommended the following:\par 1 continue present medical regimen\par 2 no further cardiac testing for this problem at this time\par \par \par Hypertension\par Hypertension is controlled on the present medical regimen. In the setting of diabetes atrial fibrillation  and probable atherosclerotic heart disease, beta blocker and Ace-I./ARB therapy are attractive. The dose of losartan  may be increased if required to maintain optimal levels\par \par I have recommended the following:\par 1 continue the present medical regimen\par 2 increase  losartan dose  if required to maintain optimal levels as discussed above.\par \par \par Valvular heart disease\par The 1/19 echocardiogram demonstrated mild mitral insufficiency aortic valve sclerosis and mild aortic insufficiency. Mild-moderate tricuspid insufficiency was also noted. Moderate pulmonary hypertension was reflected by pulmonary artery systolic pressure of 45 mmHg. The left atrial volume was markedly increased at 122 mL/m2. The left ventricle ejection fraction was 60%. The present cardiac physical examination is not suggestive of severe mitral/aortic regurgitation. In view of the lack of symptoms, absence of heart failure and clinical course continued monitoring without intervention is indicated at this time.\par \par I have recommended the following:\par 1 no further cardiac testing for this problem at this time\par \par \par \par Hyperlipidemia\par Hyperlipidemia represents a risk factor for the development of atherosclerotic heart disease. However there is no evidence of such to date. A 3/11  stress sestamibi study was normal. The left ventricle ejection fraction was 56% . The resting  7/19  electrocardiogram shows no evidence of myocardial ischemia or infarction. With a history of a stroke (Despite a cardioembolic etiology) the target LDL level is about 70.. HMG CoA reductase  inhibitor therapy has been effective. In 1/19  the  serum cholesterol level was 135  HDL 56  LDL 64 and triglycerides 72. . Non-pharmacological therapy, specifically diet and exercise are emphasized as major aspects of treatment.\par \par I have recommended the following:\par 1 low-salt low-fat low-cholesterol diet. Regular aerobic exercise\par 2 continue present medical regimen\par 3 target LDL level to about 100 as discussed above\par \par \par \par Dementia\par The working diagnosis is vascular dementia. In the setting of chronic atrial fibrillation multi-infarct dementia is a likely etiology. This problem represents the patient's major health threat\par .Further deterioration of mental and functional status is anticipated in the future with requirements for increasing social service help. This problem represents the patient's major health\par \par I have recommended  the following:\par 1. Workup and treatment as directed by internal medicine\par \par \par \par The diagnosis, prognosis, risks, options and alternatives were explained at length to the patient and family. All  questions were  answered. Issues discussed included atrial fibrillation, stroke, hyperlipidemia and dementia. Elevated risk regarding medical decision-making for this encounter  was in part related to atrial fibrillation with the potential for bleeding complications versus embolic stroke. In addition management of an elderly patient with dementia and hypertension/hyperlipidemia made treatment challenging. Counseling and coordination of care time was a significant factor for this patient encounter. Total time spent with the patient was 40 minutes. 50% of the time was devoted to counseling and coordination of care. \par \par \par \par The  diagnosis, prognosis, risks options alternatives were explained at length to both the patient and his family.  Issues discussed included atrial  fibrillation, hypertension dementia and valvular heart disease. Elevated risk regarding medical decision-making was in part related to atrial fibrillation treatment  with risks of bleeding versus stroke, valvular heart disease, hypertension and cardiovascular management in an elderly patient with dementia.

## 2020-01-11 NOTE — HISTORY OF PRESENT ILLNESS
[FreeTextEntry1] : 86-year-old man\par Routine followup\par "I feel all right." Mr. Mir denies chest pain, shortness of breath, palpitations or syncope. No bleeding complications while taking apixaban.

## 2020-01-16 ENCOUNTER — APPOINTMENT (OUTPATIENT)
Dept: PODIATRY | Facility: CLINIC | Age: 85
End: 2020-01-16
Payer: MEDICARE

## 2020-01-16 VITALS
DIASTOLIC BLOOD PRESSURE: 70 MMHG | SYSTOLIC BLOOD PRESSURE: 110 MMHG | HEIGHT: 71.97 IN | WEIGHT: 206 LBS | BODY MASS INDEX: 27.9 KG/M2

## 2020-01-16 PROCEDURE — 11721 DEBRIDE NAIL 6 OR MORE: CPT

## 2020-01-16 NOTE — PHYSICAL EXAM
[Right Foot Was Examined] : The right foot was examined [Left Foot Was Examined] : The left foot was examined [Normal Appearance] : normal in appearance [Delayed in the Right Toes] : delayed in the toes [Normal in Appearance] : normal in appearance [Tenderness] : tender [Delayed in the Left Toes] : delayed in the toes [1+] : 1+ in the dorsalis pedis [Diminished Throughout Right Foot] : diminished tactile sensation with monofilament testing throughout right foot [Diminished Throughout Left Foot] : diminished tactile sensation with monofilament testing throughout left foot [Vibration Dec.] : normal vibratory sensation at the level of the toes [Swelling] : not swollen [Full ROM] : with limited range of motion [Erythema] : not erythematous [Position Sense Dec.] : normal position sense at the level of the toes [FreeTextEntry1] : The patient has all contributing factors to onychomycosis including but not limited to thickness, subungual debris, discoloration and partial lysis and they are brittle when cut.

## 2020-01-27 ENCOUNTER — RESULT REVIEW (OUTPATIENT)
Age: 85
End: 2020-01-27

## 2020-02-01 ENCOUNTER — RX RENEWAL (OUTPATIENT)
Age: 85
End: 2020-02-01

## 2020-04-16 ENCOUNTER — APPOINTMENT (OUTPATIENT)
Dept: INTERNAL MEDICINE | Facility: CLINIC | Age: 85
End: 2020-04-16
Payer: MEDICARE

## 2020-04-16 DIAGNOSIS — M79.671 PAIN IN RIGHT FOOT: ICD-10-CM

## 2020-04-16 PROCEDURE — 99213 OFFICE O/P EST LOW 20 MIN: CPT | Mod: 95

## 2020-04-16 NOTE — HISTORY OF PRESENT ILLNESS
[FreeTextEntry1] : follow up visit via [facility], [phone] [de-identified] : Pt's wife present as well. She cancelled pod visit and has been cutting pt's nails. Soaked his feet in warm water before cutting. She cleaned the clippers with alcohol. No cuts of skin and nails not cut too low.  \par Pt is doing well overall. No fever, no SOB.  Pt has been doing PT via teleheath\par Pt has been doing for walks as well.

## 2020-04-16 NOTE — PHYSICAL EXAM
[No Acute Distress] : no acute distress [Well Nourished] : well nourished [Well Developed] : well developed [Normal Affect] : the affect was normal [Normal Mood] : the mood was normal [de-identified] : visualized feet and saw that toe nails are not too short.

## 2020-04-30 ENCOUNTER — APPOINTMENT (OUTPATIENT)
Dept: HEMATOLOGY ONCOLOGY | Facility: CLINIC | Age: 85
End: 2020-04-30

## 2020-05-07 ENCOUNTER — APPOINTMENT (OUTPATIENT)
Dept: HEMATOLOGY ONCOLOGY | Facility: CLINIC | Age: 85
End: 2020-05-07
Payer: MEDICARE

## 2020-05-07 PROCEDURE — 99215 OFFICE O/P EST HI 40 MIN: CPT | Mod: 95

## 2020-05-07 NOTE — ASSESSMENT
[FreeTextEntry1] : Normocytic to Macrocytic anemia\par Multicfactorial\par Has a component of iron deficiency\par B12, folate, TSH negative\par Additional blood work including MMA, hemolysis work  up, myeloma panel- also unremarkable\par Ultrasound abdomen shows no hepatosplenomegaly\par \par He feels good overall\par Take oral iron once a day\par Has labs drawn using [facility] on 5/1/2020\par I obtained the labs, reviewed and discussed with him\par Show slightly improved Hgb and ferritin\par Although he may benefit from IV iron, given the slow but steady response, recommend to Continue current care for now\par Bone marrow PRN\par \par COVID pandemic\par Discussed about the precautions, social distancing\par He and his wife understand very well and seem to be taking all the necessary measures\par \par Follow up in 3 months. CBC, CMP, ferritin

## 2020-05-07 NOTE — CONSULT LETTER
[Dear  ___] : Dear  [unfilled], [Courtesy Letter:] : I had the pleasure of seeing your patient, [unfilled], in my office today. [Please see my note below.] : Please see my note below. [Consult Closing:] : Thank you very much for allowing me to participate in the care of this patient.  If you have any questions, please do not hesitate to contact me. [Sincerely,] : Sincerely, [FreeTextEntry3] : Osei Negrete MD, MPH\par Attending Physician\par Hematology Oncology\par [facility] [place]\par [facility]\par

## 2020-05-07 NOTE — HISTORY OF PRESENT ILLNESS
[Home] : at home, [unfilled] , at the time of the visit. [Medical Office: ([facility]___] : at the medical office located in  [Spouse] : spouse [Patient] : the patient [Self] : self [de-identified] : Mr. Boby Mir is 85 year old male who is referred by Dr. Celestin for initial consult for macrocytic anemia.\par \par Pt with hx of A. Fib, HTN, BPH,  Type II diabetes with diabetic polyneuropathy, dementia, CVA in Januray 2019, on Eliquis 5mg bid who was advised to see hematologist for continual decreased of his hgb in the last few years. Ferritin, vitamin B12, and folate were tested with normal limits. Patient has no associated signs or symtoms. \par \par His last blood test on 8/20/2019  Hgb 11.7 Hct - 37.8 .6  Plts 185, Ferritin - 72. vitamin B12 - 408, Folate > 20.0, TSH 2.72\par \par Last normal colonoscopy was in 2013 \par no personality or family  history of hematological or oncological disoder\par \par No fevers chills night sweats\par No fatigue, tiredness, apathy\par No appetite loss or weight loss/weight gain\par No chest pain, shortness of breath, palpitation, dizziness\par No abdominal pain, nausea, vomiting, diarrhea, constipation\par No bright red blood per rectum, hematemesis or melena\par No hematuria, dysuria, frequency, urgency\par No headaches, visual changes, focal weakness, tingling, numbness\par \par \par  [de-identified] : Telemedicine (video, audio) done today for follow up\par Consent obtained from the patient\par \par No new symptoms\par Energy levels stable\par \par He is on oral iron pills once a day\par Also on eliquis\par \par He and his wife are staying home and observing social distancing

## 2020-06-09 ENCOUNTER — APPOINTMENT (OUTPATIENT)
Dept: PODIATRY | Facility: CLINIC | Age: 85
End: 2020-06-09
Payer: MEDICARE

## 2020-06-09 VITALS
SYSTOLIC BLOOD PRESSURE: 114 MMHG | WEIGHT: 206 LBS | HEIGHT: 71.97 IN | BODY MASS INDEX: 27.9 KG/M2 | DIASTOLIC BLOOD PRESSURE: 80 MMHG

## 2020-06-09 PROCEDURE — 11721 DEBRIDE NAIL 6 OR MORE: CPT

## 2020-06-09 NOTE — PHYSICAL EXAM
[Right Foot Was Examined] : The right foot was examined [Left Foot Was Examined] : The left foot was examined [Normal Appearance] : normal in appearance [Delayed in the Right Toes] : delayed in the toes [Normal in Appearance] : normal in appearance [Swelling] : not swollen [Full ROM] : with limited range of motion [Tenderness] : tender [Erythema] : not erythematous [Delayed in the Left Toes] : delayed in the toes [1+] : 1+ in the dorsalis pedis [Vibration Dec.] : diminished vibratory sensation at the level of the toes [Position Sense Dec.] : normal position sense at the level of the toes [Diminished Throughout Right Foot] : diminished tactile sensation with monofilament testing throughout right foot [Diminished Throughout Left Foot] : diminished tactile sensation with monofilament testing throughout left foot [FreeTextEntry1] : The patient has all contributing factors to onychomycosis including but not limited to thickness, subungual debris, discoloration and partial lysis and they are brittle when cut.

## 2020-07-10 ENCOUNTER — APPOINTMENT (OUTPATIENT)
Dept: CARDIOLOGY | Facility: CLINIC | Age: 85
End: 2020-07-10
Payer: MEDICARE

## 2020-07-10 ENCOUNTER — NON-APPOINTMENT (OUTPATIENT)
Age: 85
End: 2020-07-10

## 2020-07-10 VITALS
SYSTOLIC BLOOD PRESSURE: 110 MMHG | DIASTOLIC BLOOD PRESSURE: 52 MMHG | OXYGEN SATURATION: 95 % | WEIGHT: 198 LBS | BODY MASS INDEX: 26.88 KG/M2 | TEMPERATURE: 98.4 F | HEART RATE: 65 BPM

## 2020-07-10 DIAGNOSIS — Z86.2 PERSONAL HISTORY OF DISEASES OF THE BLOOD AND BLOOD-FORMING ORGANS AND CERTAIN DISORDERS INVOLVING THE IMMUNE MECHANISM: ICD-10-CM

## 2020-07-10 PROCEDURE — 99214 OFFICE O/P EST MOD 30 MIN: CPT

## 2020-07-10 PROCEDURE — 93000 ELECTROCARDIOGRAM COMPLETE: CPT

## 2020-07-11 PROBLEM — Z86.2 HISTORY OF ANEMIA: Status: RESOLVED | Noted: 2019-08-21 | Resolved: 2020-07-11

## 2020-07-11 NOTE — DISCUSSION/SUMMARY
[FreeTextEntry1] : Shortness of breath\par The working diagnosis is dyspnea on exertion secondary to aging and deconditioning. The history is consistent with this diagnosis. The physical examination is not suggestive of pulmonary venous congestion/heart failure as a cause for the patient's symptoms. The absence of chest pain or electrocardiographic  findings of ischemia is on the 7/10/20 electrocardiogram argue against myocardial ischemia/atherosclerotic heart disease.\par \par I have recommended the following:\par 1 low-salt low-fat low-cholesterol diet. Regular aerobic exercise\par 2 echocardiogram with next office evaluation in 6 months\par \par \par Atrial fibrillation\par The working diagnosis is chronic atrial fibrillation secondary to hypertensive-diabetic-valvular and probable atherosclerotic heart disease. Boby has had atrial fibrillation since 1988. Prior attempts at electrocardioversion were unsuccessful. Adequate rate control of the ventricular response is achieved with the present medical regimen. A 3/14 Holter monitor demonstrated atrial fibrillation with an average ventricular rate of 64/minute. A 3.2 second pause without symptom correlation was noted. Lanoxin therapy administered at that time was discontinued. An elevated "[id number]" score is indicative of an increased risk of systemic/cerebral  emboli. .Prior  treatment with warfarin was discontinued due to concerns for falling/bleeding. However in 1/19 Apixaban was instituted following cardioembolic bilateral occipital strokes.\par \par I have recommended the following:\par 1 continue present medical regimen\par 2 no further cardiac testing for this problem at this time\par \par \par Hypertension\par Hypertension is controlled on the present medical regimen. In the setting of diabetes atrial fibrillation  and probable atherosclerotic heart disease, beta blocker and Ace-I./ARB therapy are attractive. The dose of losartan  may be increased if required to maintain optimal levels\par \par I have recommended the following:\par 1 continue the present medical regimen\par 2 increase  losartan dose  if required to maintain optimal levels as discussed above.\par \par \par Valvular heart disease\par The 1/19 echocardiogram demonstrated mild mitral insufficiency aortic valve sclerosis and mild aortic insufficiency. Mild-moderate tricuspid insufficiency was also noted. Moderate pulmonary hypertension was reflected by pulmonary artery systolic pressure of 45 mmHg. The left atrial volume was markedly increased at 122 mL/m2. The left ventricle ejection fraction was 60%. The present cardiac physical examination is not suggestive of severe mitral/aortic regurgitation. In view of the lack of symptoms, absence of heart failure and clinical course continued monitoring without intervention is indicated at this time.\par \par I have recommended the following:\par 1 no further cardiac testing for this problem at this time\par 2. Echocardiogram with  next office evaluation in 6 months\par \par \par \par Hyperlipidemia\par Hyperlipidemia represents a risk factor for the development of atherosclerotic heart disease. However there is no evidence of such to date. A 3/11  stress sestamibi study was normal. The left ventricle ejection fraction was 56% . The resting  7/20  electrocardiogram shows no evidence of myocardial ischemia or infarction. With a history of a stroke (Despite a cardioembolic etiology) the target LDL level is about 70.. HMG CoA reductase  inhibitor therapy has been effective. In 8/19  the  serum cholesterol level was 124   HDL 50  LDL 62 and triglycerides 61. . Non-pharmacological therapy, specifically diet and exercise are emphasized as major aspects of treatment.\par \par I have recommended the following:\par 1 low-salt low-fat low-cholesterol diet. Regular aerobic exercise\par 2 continue present medical regimen\par 3 target LDL level to about 100 as discussed above\par \par \par \par Dementia\par The working diagnosis is vascular dementia. In the setting of chronic atrial fibrillation multi-infarct dementia is a likely etiology. This problem represents the patient's major health threat\par .Further deterioration of mental and functional status is anticipated in the future with requirements for increasing social service help. This problem represents the patient's major health\par \par I have recommended  the following:\par 1. Workup and treatment as directed by internal medicine\par \par \par \par The diagnosis, prognosis, risks, options and alternatives were explained at length to the patient and family. All  questions were  answered. Issues discussed included atrial fibrillation, stroke, hyperlipidemia and dementia. Elevated risk regarding medical decision-making for this encounter  was in part related to atrial fibrillation with the potential for bleeding complications versus embolic stroke. In addition management of an elderly patient with dementia  symptoms  of dyspnea and hypertension/hyperlipidemia made treatment challenging. \par \par \par . Counseling and coordination of care time was a significant factor for this patient encounter. Total time spent with the patient was 25  minutes. 50% of the time was devoted to counseling and coordination of care. \par \par \par \par The  diagnosis, prognosis, risks options alternatives were explained at length to both the patient and his family.  Issues discussed included atrial  fibrillation, hypertension dementia and valvular heart disease. Elevated risk regarding medical decision-making was in part related to atrial fibrillation treatment  with risks of bleeding versus stroke, valvular heart disease, hypertension and cardiovascular management in an elderly patient with dementia.

## 2020-07-11 NOTE — PHYSICAL EXAM
[Normal Conjunctiva] : the conjunctiva exhibited no abnormalities [Auscultation Breath Sounds / Voice Sounds] : lungs were clear to auscultation bilaterally [Murmurs] : no murmurs present [Heart Sounds] : normal S1 and S2 [Bowel Sounds] : normal bowel sounds [Abdomen Soft] : soft [Cyanosis, Localized] : no localized cyanosis [Abnormal Walk] : normal gait [Abdomen Tenderness] : non-tender [] : no rash [Affect] : the affect was normal [FreeTextEntry1] : pleasant but forgetful

## 2020-07-11 NOTE — HISTORY OF PRESENT ILLNESS
[FreeTextEntry1] : 86 year old man\par Routine followup\par \par "I feel okay." Mr. Mir denies chest pain, shortness of breath at rest palpitations ankle edema or syncope. His wife reports observing dyspnea on exertion.\par \par Boby is accompanied today by his wife who is the primary caregiver

## 2020-07-15 RX ORDER — ATORVASTATIN CALCIUM 20 MG/1
20 TABLET, FILM COATED ORAL DAILY
Qty: 90 | Refills: 3 | Status: ACTIVE | COMMUNITY

## 2020-07-26 RX ORDER — METOPROLOL SUCCINATE 50 MG/1
50 TABLET, EXTENDED RELEASE ORAL
Qty: 180 | Refills: 3 | Status: ACTIVE | COMMUNITY
Start: 2020-07-26 | End: 1900-01-01

## 2020-08-10 NOTE — CONSULT LETTER
[Dear  ___] : Dear  [unfilled], [Please see my note below.] : Please see my note below. [Courtesy Letter:] : I had the pleasure of seeing your patient, [unfilled], in my office today. [Consult Closing:] : Thank you very much for allowing me to participate in the care of this patient.  If you have any questions, please do not hesitate to contact me. [Sincerely,] : Sincerely, [FreeTextEntry3] : Osei Negrete MD, MPH\par Attending Physician\par Hematology Oncology\par [facility] [place]\par [facility]\par

## 2020-08-10 NOTE — HISTORY OF PRESENT ILLNESS
[Home] : at home, [unfilled] , at the time of the visit. [Medical Office: ([facility])___] : at the medical office located in  [Patient] : the patient [Spouse] : spouse [Self] : self [de-identified] : Mr. Boby Mir is 85 year old male who is referred by Dr. Celestin for initial consult for macrocytic anemia.\par \par Pt with hx of A. Fib, HTN, BPH,  Type II diabetes with diabetic polyneuropathy, dementia, CVA in Januray 2019, on Eliquis 5mg bid who was advised to see hematologist for continual decreased of his hgb in the last few years. Ferritin, vitamin B12, and folate were tested with normal limits. Patient has no associated signs or symtoms. \par \par His last blood test on 8/20/2019  Hgb 11.7 Hct - 37.8 .6  Plts 185, Ferritin - 72. vitamin B12 - 408, Folate > 20.0, TSH 2.72\par \par Last normal colonoscopy was in 2013 \par no personality or family  history of hematological or oncological disoder\par \par No fevers chills night sweats\par No fatigue, tiredness, apathy\par No appetite loss or weight loss/weight gain\par No chest pain, shortness of breath, palpitation, dizziness\par No abdominal pain, nausea, vomiting, diarrhea, constipation\par No bright red blood per rectum, hematemesis or melena\par No hematuria, dysuria, frequency, urgency\par No headaches, visual changes, focal weakness, tingling, numbness\par \par \par  [de-identified] : He is seen today for follow up\par \par No new symptoms\par Energy levels stable\par \par He is on oral iron pills once a day\par Also on eliquis\par \par

## 2020-08-10 NOTE — ASSESSMENT
[FreeTextEntry1] : Normocytic to Macrocytic anemia\par Multifactorial\par Has a component of iron deficiency\par B12, folate, TSH negative\par Additional blood work including MMA, hemolysis work  up, myeloma panel- also unremarkable\par Ultrasound abdomen shows no hepatosplenomegaly\par \par He feels good overall\par Take oral iron once a day\par Labs reviewed and discussed\par Ferritin improved\par Still with mild anemia. \par It is possible that he has underlying bone marrow condition such as MDS. Discussed about bone marrow\par At this time, will monitor counts and symptoms since anemia is mild and he feels good overall in terms of energy \par Will consider Bone marrow PRN\par \par Follow up in 3 months. CBC, CMP, ferritin

## 2020-08-20 NOTE — PROCEDURE
[FreeTextEntry1] : Using sterile instrumentation debridement of all nails manually and electrically to decrease thickness, pain and girth and make shoe gear more comfortable with "slant back" procedure of any bordering spicules causing pain\par I have had a lengthy discussion with the patient regarding overall skincare. The importance of the type of socks, the type of shoes, and the type of overall foot hygiene is important to help control and prevent eruptions especially over extreme weather changes. This included but was not limited to hydration and lubrication, dove soap, triple rinse clothing and linens. I also explained the importance of thorough drying of both feet especially the web spaces. Given the extreme temperatures back in a car I also reviewed the type of shoes that would help reduce the chances of cracking of the skin especially leading to fissuring of the heels. Overall skincare precautions were reviewed education literature dispensed in the patient's questions asked and answered appropriately.\par follow up prn\par

## 2020-09-01 NOTE — REVIEW OF SYSTEMS
[Negative] : Heme/Lymph [FreeTextEntry2] : 10 lb less than in [place] [FreeTextEntry4] : skin lesion on nose [de-identified] : lesion on nose for 1 week

## 2020-09-01 NOTE — PHYSICAL EXAM
[No Acute Distress] : no acute distress [Well Nourished] : well nourished [Well Developed] : well developed [Normal Affect] : the affect was normal [Normal Mood] : the mood was normal [Normal Sclera/Conjunctiva] : normal sclera/conjunctiva [EOMI] : extraocular movements intact [Normal Outer Ear/Nose] : the outer ears and nose were normal in appearance [No JVD] : no jugular venous distention [Supple] : supple [Normal] : no respiratory distress, lungs were clear to auscultation bilaterally and no accessory muscle use [Normal Rate] : normal rate  [Normal S1, S2] : normal S1 and S2 [No Edema] : there was no peripheral edema [Soft] : abdomen soft [Non Tender] : non-tender [No Masses] : no abdominal mass palpated [No HSM] : no HSM [Normal Bowel Sounds] : normal bowel sounds [Declined Rectal Exam] : declined rectal exam [Normal Posterior Cervical Nodes] : no posterior cervical lymphadenopathy [Normal Anterior Cervical Nodes] : no anterior cervical lymphadenopathy [No Joint Swelling] : no joint swelling [Coordination Grossly Intact] : coordination grossly intact [No Focal Deficits] : no focal deficits [Normal Gait] : normal gait [de-identified] : irreg I/VI systolic murmur [FreeTextEntry1] : states it ws done by Dr Geiger [de-identified] : small lesion on nose with scab

## 2020-09-01 NOTE — HISTORY OF PRESENT ILLNESS
[PMH Reviewed and Updated] : past medical history reviewed and updated [PSH Reviewed and Updated] : past surgical history reviewed and updated [Family History Reviewed and Updated] : family history reviewed and updated [0] : 0 [Retired] : retired from work [None] : The patient has no concerns about alcohol abuse [Never] : has never used illicit drugs [Compliant with medications] : compliant with medications [Spouse] : spouse [Extended Family] : extended family [Needs some help with ADLs] : need some help with ADLs [Does not drive] : does not drive [No history of falls] : no history of falls [Seatbelts] : seatbelts [Smoke Detectors] : smoke detectors [Carbon Monoxide Detector] : carbon monoxide detector [Hot Water Temp <120F] : hot water temp <120F [Bathroom Grab Bars] : bathroom grab bars [Fall Prevention Measures] : fall prevention measures [Sunscreen] : sunscreen [Patient Has Full Capacity] : this patient has full decision making capacity for discussion of advance care planning [de-identified] : Pt here for medicare wellness. He has been well during the pandemic. He has been mostly at home. Does PT via [facility] twice weekly. He goes out for walks.  No chest pains, SOB, no issues with urination. \par His wife is here with him. He has a lesion on his nose that she is concerned about. [Over the Past 2 Weeks, Have You Felt Down, Depressed, or Hopeless?] : 1.) Over the past 2 weeks, have you felt down, depressed, or hopeless? No [Over the Past 2 Weeks, Have You Felt Little Interest or Pleasure Doing Things?] : 2.) Over the past 2 weeks, have you felt little interest or pleasure doing things? No [Bicycle Helmet] : not using bicycle helmet [Motorcycle Helmet] : not using motorcycle helmet [Safe Driving Habits] : not using safe driving habits [Gun Trigger Locks] : not using gun trigger locks [Gun Safe] : not using a gun safe [CPR Training for Household] : did not receive CPR training for patient [CPR Training for Patient] : did not receive CPR training for patient [de-identified] : none [de-identified] : none [FreeTextEntry1] : none [FreeTextEntry9] : physical therapy

## 2020-09-01 NOTE — HEALTH RISK ASSESSMENT
[Good] : ~his/her~  mood as  good [Patient reported colonoscopy was normal] : Patient reported colonoscopy was normal [None] : None [With Significant Other] : lives with significant other [Retired] : retired [] :  [Feels Safe at Home] : Feels safe at home [Fully functional (bathing, dressing, toileting, transferring, walking, feeding)] : Fully functional (bathing, dressing, toileting, transferring, walking, feeding) [] : No [ColonoscopyDate] : 01/13 [ColonoscopyComments] : Dr Titus

## 2020-10-27 NOTE — PROCEDURE
[FreeTextEntry1] : Using sterile instrumentation debridement of all nails manually and electrically to decrease thickness, pain and girth and make shoe gear more comfortable with "slant back" procedure of any bordering spicules causing pain\par \par follow up prn\par

## 2020-12-09 NOTE — ASSESSMENT
[FreeTextEntry1] : Normocytic to Macrocytic anemia\par Multifactorial\par Has a component of iron deficiency\par B12, folate, TSH negative\par Additional blood work including MMA, hemolysis work  up, myeloma panel- also unremarkable\par Ultrasound abdomen shows no hepatosplenomegaly\par \par He feels good overall\par Take oral iron once a day\par Labs reviewed and discussed\par Still with mild anemia. \par It is possible that he has underlying bone marrow condition such as MDS. \par At this time, will monitor counts and symptoms since anemia is mild and he feels good overall in terms of energy \par Will consider Bone marrow only PRN\par \par Follow up in 6 months. CBC, CMP, ferritin

## 2020-12-09 NOTE — PHYSICAL EXAM
[Capable of only limited self care, confined to bed or chair more than 50% of waking hours] : Status 3- Capable of only limited self care, confined to bed or chair more than 50% of waking hours [Normal] : normal spine exam without palpable tenderness, no kyphosis or scoliosis [de-identified] : Dementia

## 2020-12-09 NOTE — CONSULT LETTER
[Dear  ___] : Dear  [unfilled], [Courtesy Letter:] : I had the pleasure of seeing your patient, [unfilled], in my office today. [Please see my note below.] : Please see my note below. [Consult Closing:] : Thank you very much for allowing me to participate in the care of this patient.  If you have any questions, please do not hesitate to contact me. [Sincerely,] : Sincerely, [FreeTextEntry3] : Osei Negrete MD, MPH\par Attending Physician\par Hematology Oncology\par [facility] [place]\par [facility]\par

## 2020-12-09 NOTE — HISTORY OF PRESENT ILLNESS
[de-identified] : Mr. Boby Mir is 85 year old male who is referred by Dr. Celestin for initial consult for macrocytic anemia.\par \par Pt with hx of A. Fib, HTN, BPH,  Type II diabetes with diabetic polyneuropathy, dementia, CVA in Januray 2019, on Eliquis 5mg bid who was advised to see hematologist for continual decreased of his hgb in the last few years. Ferritin, vitamin B12, and folate were tested with normal limits. Patient has no associated signs or symtoms. \par \par His last blood test on 8/20/2019  Hgb 11.7 Hct - 37.8 .6  Plts 185, Ferritin - 72. vitamin B12 - 408, Folate > 20.0, TSH 2.72\par \par Last normal colonoscopy was in 2013 \par no personality or family  history of hematological or oncological disoder\par \par No fevers chills night sweats\par No fatigue, tiredness, apathy\par No appetite loss or weight loss/weight gain\par No chest pain, shortness of breath, palpitation, dizziness\par No abdominal pain, nausea, vomiting, diarrhea, constipation\par No bright red blood per rectum, hematemesis or melena\par No hematuria, dysuria, frequency, urgency\par No headaches, visual changes, focal weakness, tingling, numbness\par \par \par  [de-identified] : He is seen today for follow up\par Accompanied by his wife\par \par Energy levels stable\par Continues to be forgetful\par Was recently admitted to [facility] with neurological symptoms, likely TIA\par \par He is on oral iron pills once a day\par Also on eliquis\par \par

## 2020-12-17 PROBLEM — Z87.898 HISTORY OF SHORTNESS OF BREATH: Status: RESOLVED | Noted: 2020-07-11 | Resolved: 2020-01-01

## 2020-12-17 PROBLEM — Z71.89 ADVICE GIVEN ABOUT COVID-19 VIRUS INFECTION: Status: RESOLVED | Noted: 2020-05-07 | Resolved: 2020-01-01

## 2020-12-17 NOTE — PHYSICAL EXAM
[No Acute Distress] : no acute distress [Well Nourished] : well nourished [Well Developed] : well developed [Normal Sclera/Conjunctiva] : normal sclera/conjunctiva [EOMI] : extraocular movements intact [No JVD] : no jugular venous distention [Supple] : supple [Normal] : no respiratory distress, lungs were clear to auscultation bilaterally and no accessory muscle use [Normal Rate] : normal rate  [Normal S1, S2] : normal S1 and S2 [No Edema] : there was no peripheral edema [Declined Rectal Exam] : declined rectal exam [No Joint Swelling] : no joint swelling [Coordination Grossly Intact] : coordination grossly intact [No Focal Deficits] : no focal deficits [Normal Gait] : normal gait [Normal Affect] : the affect was normal [Normal Mood] : the mood was normal [de-identified] : irreg I/VI systolic murmur [FreeTextEntry1] : states it ws done by Dr Geiger [de-identified] : small lesion on nose with scab

## 2020-12-17 NOTE — HEALTH RISK ASSESSMENT
[One fall no injury in past year] : Patient reported one fall in the past year without injury [0] : 2) Feeling down, depressed, or hopeless: Not at all (0) [Patient reported colonoscopy was normal] : Patient reported colonoscopy was normal [None] : None [With Significant Other] : lives with significant other [Retired] : retired [] :  [Feels Safe at Home] : Feels safe at home [Fully functional (bathing, dressing, toileting, transferring, walking, feeding)] : Fully functional (bathing, dressing, toileting, transferring, walking, feeding) [ColonoscopyDate] : 01/13 [ColonoscopyComments] : Dr Titus

## 2020-12-17 NOTE — REVIEW OF SYSTEMS
[Memory Loss] : memory loss [Easy Bruising] : easy bruising [Negative] : Musculoskeletal [Fever] : no fever [Dysuria] : no dysuria [Hematuria] : no hematuria [Easy Bleeding] : no easy bleeding

## 2020-12-17 NOTE — HISTORY OF PRESENT ILLNESS
[FreeTextEntry1] : f/u from hospital [de-identified] : Pt had an episode of incoherent speech lasting for approx 10 minutes. No facial droop. No motor deficit. He was taken to the ER at [place]. and admitted overnight. Diagnosed with TIA. This was one month ago\par He was adm from 11/21/20 to 11/22/20, CTA Brain was done and was neg. Pt was not discharged on any additional meds.  Pt has been at his baseline since then. Will be having an echo with Dr Cobb. No palpitations or chest pressure. No SOB. \par Pt has been on Eliquis since his first TIA in 2019. He has chronic A fib.\par Pt states that he feels well today and as per his wife he is doing well.

## 2021-01-01 ENCOUNTER — APPOINTMENT (OUTPATIENT)
Dept: NEUROLOGY | Facility: CLINIC | Age: 86
End: 2021-01-01
Payer: MEDICARE

## 2021-01-01 ENCOUNTER — APPOINTMENT (OUTPATIENT)
Dept: INTERNAL MEDICINE | Facility: CLINIC | Age: 86
End: 2021-01-01

## 2021-01-01 ENCOUNTER — LABORATORY RESULT (OUTPATIENT)
Age: 86
End: 2021-01-01

## 2021-01-01 ENCOUNTER — NON-APPOINTMENT (OUTPATIENT)
Age: 86
End: 2021-01-01

## 2021-01-01 ENCOUNTER — RESULT REVIEW (OUTPATIENT)
Age: 86
End: 2021-01-01

## 2021-01-01 ENCOUNTER — APPOINTMENT (OUTPATIENT)
Dept: HEMATOLOGY ONCOLOGY | Facility: CLINIC | Age: 86
End: 2021-01-01

## 2021-01-01 ENCOUNTER — FORM ENCOUNTER (OUTPATIENT)
Age: 86
End: 2021-01-01

## 2021-01-01 ENCOUNTER — APPOINTMENT (OUTPATIENT)
Dept: INTERNAL MEDICINE | Facility: CLINIC | Age: 86
End: 2021-01-01
Payer: MEDICARE

## 2021-01-01 ENCOUNTER — APPOINTMENT (OUTPATIENT)
Dept: PODIATRY | Facility: CLINIC | Age: 86
End: 2021-01-01
Payer: MEDICARE

## 2021-01-01 ENCOUNTER — APPOINTMENT (OUTPATIENT)
Dept: NEUROLOGY | Facility: CLINIC | Age: 86
End: 2021-01-01

## 2021-01-01 ENCOUNTER — APPOINTMENT (OUTPATIENT)
Dept: CARDIOLOGY | Facility: CLINIC | Age: 86
End: 2021-01-01
Payer: MEDICARE

## 2021-01-01 ENCOUNTER — RX RENEWAL (OUTPATIENT)
Age: 86
End: 2021-01-01

## 2021-01-01 ENCOUNTER — APPOINTMENT (OUTPATIENT)
Dept: CARDIOLOGY | Facility: CLINIC | Age: 86
End: 2021-01-01

## 2021-01-01 VITALS
WEIGHT: 198 LBS | HEART RATE: 107 BPM | BODY MASS INDEX: 26.24 KG/M2 | TEMPERATURE: 97.2 F | HEIGHT: 73 IN | DIASTOLIC BLOOD PRESSURE: 56 MMHG | SYSTOLIC BLOOD PRESSURE: 109 MMHG

## 2021-01-01 VITALS
BODY MASS INDEX: 26.24 KG/M2 | TEMPERATURE: 98 F | DIASTOLIC BLOOD PRESSURE: 78 MMHG | SYSTOLIC BLOOD PRESSURE: 136 MMHG | HEIGHT: 73 IN | WEIGHT: 198 LBS

## 2021-01-01 VITALS
HEIGHT: 73 IN | BODY MASS INDEX: 25.45 KG/M2 | DIASTOLIC BLOOD PRESSURE: 78 MMHG | SYSTOLIC BLOOD PRESSURE: 118 MMHG | WEIGHT: 192 LBS | TEMPERATURE: 97.8 F

## 2021-01-01 VITALS
TEMPERATURE: 97.6 F | BODY MASS INDEX: 26.11 KG/M2 | SYSTOLIC BLOOD PRESSURE: 106 MMHG | WEIGHT: 197 LBS | HEIGHT: 73 IN | DIASTOLIC BLOOD PRESSURE: 66 MMHG

## 2021-01-01 VITALS
BODY MASS INDEX: 24.92 KG/M2 | WEIGHT: 188 LBS | DIASTOLIC BLOOD PRESSURE: 60 MMHG | SYSTOLIC BLOOD PRESSURE: 110 MMHG | HEIGHT: 73 IN

## 2021-01-01 VITALS
SYSTOLIC BLOOD PRESSURE: 114 MMHG | TEMPERATURE: 98.5 F | WEIGHT: 195 LBS | DIASTOLIC BLOOD PRESSURE: 64 MMHG | BODY MASS INDEX: 25.84 KG/M2 | HEIGHT: 73 IN

## 2021-01-01 VITALS
TEMPERATURE: 97.2 F | BODY MASS INDEX: 24.92 KG/M2 | WEIGHT: 208 LBS | BODY MASS INDEX: 29.12 KG/M2 | HEART RATE: 60 BPM | HEIGHT: 73 IN | HEIGHT: 71 IN | WEIGHT: 188 LBS | SYSTOLIC BLOOD PRESSURE: 116 MMHG | DIASTOLIC BLOOD PRESSURE: 70 MMHG

## 2021-01-01 VITALS
OXYGEN SATURATION: 96 % | DIASTOLIC BLOOD PRESSURE: 50 MMHG | HEART RATE: 59 BPM | BODY MASS INDEX: 24.8 KG/M2 | TEMPERATURE: 97 F | SYSTOLIC BLOOD PRESSURE: 95 MMHG | WEIGHT: 188 LBS

## 2021-01-01 DIAGNOSIS — E11.42 TYPE 2 DIABETES MELLITUS WITH DIABETIC POLYNEUROPATHY: ICD-10-CM

## 2021-01-01 DIAGNOSIS — I10 ESSENTIAL (PRIMARY) HYPERTENSION: ICD-10-CM

## 2021-01-01 DIAGNOSIS — Z86.79 PERSONAL HISTORY OF OTHER DISEASES OF THE CIRCULATORY SYSTEM: ICD-10-CM

## 2021-01-01 DIAGNOSIS — R39.9 UNSPECIFIED SYMPTOMS AND SIGNS INVOLVING THE GENITOURINARY SYSTEM: ICD-10-CM

## 2021-01-01 DIAGNOSIS — R56.9 UNSPECIFIED CONVULSIONS: ICD-10-CM

## 2021-01-01 DIAGNOSIS — B35.1 TINEA UNGUIUM: ICD-10-CM

## 2021-01-01 DIAGNOSIS — I48.20 CHRONIC ATRIAL FIBRILLATION, UNSP: ICD-10-CM

## 2021-01-01 DIAGNOSIS — E78.5 HYPERLIPIDEMIA, UNSPECIFIED: ICD-10-CM

## 2021-01-01 DIAGNOSIS — M79.672 PAIN IN RIGHT FOOT: ICD-10-CM

## 2021-01-01 DIAGNOSIS — M79.672 PAIN IN LEFT FOOT: ICD-10-CM

## 2021-01-01 DIAGNOSIS — G40.209 LOCALIZATION-RELATED (FOCAL) (PARTIAL) SYMPTOMATIC EPILEPSY AND EPILEPTIC SYNDROMES WITH COMPLEX PARTIAL SEIZURES, NOT INTRACTABLE, W/OUT STATUS EPILEPTICUS: ICD-10-CM

## 2021-01-01 DIAGNOSIS — N40.0 BENIGN PROSTATIC HYPERPLASIA WITHOUT LOWER URINARY TRACT SYMPMS: ICD-10-CM

## 2021-01-01 DIAGNOSIS — I63.9 CEREBRAL INFARCTION, UNSPECIFIED: ICD-10-CM

## 2021-01-01 DIAGNOSIS — D53.9 NUTRITIONAL ANEMIA, UNSPECIFIED: ICD-10-CM

## 2021-01-01 DIAGNOSIS — Z86.2 PERSONAL HISTORY OF DISEASES OF THE BLOOD AND BLOOD-FORMING ORGANS AND CERTAIN DISORDERS INVOLVING THE IMMUNE MECHANISM: ICD-10-CM

## 2021-01-01 DIAGNOSIS — M79.671 PAIN IN RIGHT FOOT: ICD-10-CM

## 2021-01-01 DIAGNOSIS — G89.29 PAIN IN RIGHT FOOT: ICD-10-CM

## 2021-01-01 DIAGNOSIS — I38 ENDOCARDITIS, VALVE UNSPECIFIED: ICD-10-CM

## 2021-01-01 DIAGNOSIS — D75.89 OTHER SPECIFIED DISEASES OF BLOOD AND BLOOD-FORMING ORGANS: ICD-10-CM

## 2021-01-01 DIAGNOSIS — G45.9 TRANSIENT CEREBRAL ISCHEMIC ATTACK, UNSPECIFIED: ICD-10-CM

## 2021-01-01 DIAGNOSIS — I70.91 GENERALIZED ATHEROSCLEROSIS: ICD-10-CM

## 2021-01-01 DIAGNOSIS — Z86.69 PERSONAL HISTORY OF OTHER DISEASES OF THE NERVOUS SYSTEM AND SENSE ORGANS: ICD-10-CM

## 2021-01-01 DIAGNOSIS — R31.29 OTHER MICROSCOPIC HEMATURIA: ICD-10-CM

## 2021-01-01 DIAGNOSIS — F03.90 UNSPECIFIED DEMENTIA W/OUT BEHAVIORAL DISTURBANCE: ICD-10-CM

## 2021-01-01 LAB
25(OH)D3 SERPL-MCNC: 37.9 NG/ML
ALBUMIN SERPL ELPH-MCNC: 4.1 G/DL
ALBUMIN SERPL ELPH-MCNC: 4.2 G/DL
ALP BLD-CCNC: 63 U/L
ALP BLD-CCNC: 79 U/L
ALT SERPL-CCNC: 11 U/L
ALT SERPL-CCNC: 13 U/L
ANION GAP SERPL CALC-SCNC: 15 MMOL/L
ANION GAP SERPL CALC-SCNC: 15 MMOL/L
APPEARANCE: ABNORMAL
AST SERPL-CCNC: 18 U/L
AST SERPL-CCNC: 23 U/L
BILIRUB SERPL-MCNC: 0.5 MG/DL
BILIRUB SERPL-MCNC: 0.6 MG/DL
BILIRUBIN URINE: NEGATIVE
BLOOD URINE: ABNORMAL
BUN SERPL-MCNC: 26 MG/DL
BUN SERPL-MCNC: 30 MG/DL
CALCIUM SERPL-MCNC: 10 MG/DL
CALCIUM SERPL-MCNC: 10.3 MG/DL
CHLORIDE SERPL-SCNC: 101 MMOL/L
CHLORIDE SERPL-SCNC: 105 MMOL/L
CO2 SERPL-SCNC: 24 MMOL/L
CO2 SERPL-SCNC: 24 MMOL/L
COLOR: YELLOW
CREAT SERPL-MCNC: 1.32 MG/DL
CREAT SERPL-MCNC: 1.36 MG/DL
GLUCOSE QUALITATIVE U: NEGATIVE
GLUCOSE SERPL-MCNC: 157 MG/DL
GLUCOSE SERPL-MCNC: 88 MG/DL
KETONES URINE: NEGATIVE
LAMOTRIGINE SERPL-MCNC: 6.6 UG/ML
LAMOTRIGINE SERPL-MCNC: 9.1 UG/ML
LEUKOCYTE ESTERASE URINE: NEGATIVE
LEVETIRACETAM SERPL-MCNC: 25.8 UG/ML
LEVETIRACETAM SERPL-MCNC: 26.9 UG/ML
NITRITE URINE: NEGATIVE
PH URINE: 5.5
POTASSIUM SERPL-SCNC: 4.2 MMOL/L
POTASSIUM SERPL-SCNC: 4.7 MMOL/L
PROT SERPL-MCNC: 6.2 G/DL
PROT SERPL-MCNC: 6.4 G/DL
PROTEIN URINE: ABNORMAL
SODIUM SERPL-SCNC: 140 MMOL/L
SODIUM SERPL-SCNC: 142 MMOL/L
SPECIFIC GRAVITY URINE: >=1.03
UROBILINOGEN URINE: NORMAL
VIT B12 SERPL-MCNC: 486 PG/ML

## 2021-01-01 PROCEDURE — 11721 DEBRIDE NAIL 6 OR MORE: CPT

## 2021-01-01 PROCEDURE — 99215 OFFICE O/P EST HI 40 MIN: CPT

## 2021-01-01 PROCEDURE — 99214 OFFICE O/P EST MOD 30 MIN: CPT

## 2021-01-01 PROCEDURE — 95719 EEG PHYS/QHP EA INCR W/O VID: CPT

## 2021-01-01 PROCEDURE — 95819 EEG AWAKE AND ASLEEP: CPT

## 2021-01-01 PROCEDURE — 95700 EEG CONT REC W/VID EEG TECH: CPT

## 2021-01-01 PROCEDURE — 93000 ELECTROCARDIOGRAM COMPLETE: CPT

## 2021-01-01 PROCEDURE — 95708 EEG WO VID EA 12-26HR UNMNTR: CPT

## 2021-01-01 PROCEDURE — 99495 TRANSJ CARE MGMT MOD F2F 14D: CPT

## 2021-01-01 RX ORDER — LOSARTAN POTASSIUM 50 MG/1
50 TABLET, FILM COATED ORAL
Qty: 90 | Refills: 3 | Status: DISCONTINUED | COMMUNITY
End: 2021-01-01

## 2021-01-01 RX ORDER — APIXABAN 5 MG/1
5 TABLET, FILM COATED ORAL
Qty: 180 | Refills: 3 | Status: DISCONTINUED | COMMUNITY
Start: 2019-02-04 | End: 2021-01-01

## 2021-01-01 RX ORDER — LEVETIRACETAM 1000 MG/1
TABLET, FILM COATED ORAL
Refills: 0 | Status: DISCONTINUED | COMMUNITY
End: 2021-01-01

## 2021-01-01 RX ORDER — METFORMIN HYDROCHLORIDE 500 MG/1
500 TABLET, COATED ORAL
Qty: 180 | Refills: 1 | Status: ACTIVE | COMMUNITY
Start: 2019-02-13 | End: 1900-01-01

## 2021-01-01 RX ORDER — LEVETIRACETAM 500 MG/1
500 TABLET, FILM COATED ORAL TWICE DAILY
Qty: 60 | Refills: 2 | Status: ACTIVE | COMMUNITY
Start: 2021-01-01 | End: 1900-01-01

## 2021-01-01 RX ORDER — LAMOTRIGINE 25 MG/1
25 TABLET ORAL
Qty: 240 | Refills: 0 | Status: DISCONTINUED | COMMUNITY
Start: 2021-01-01 | End: 2021-01-01

## 2021-01-01 RX ORDER — APIXABAN 5 MG/1
5 TABLET, FILM COATED ORAL
Qty: 180 | Refills: 3 | Status: DISCONTINUED | COMMUNITY
Start: 2021-01-01 | End: 2021-01-01

## 2021-01-01 RX ORDER — LAMOTRIGINE 100 MG/1
100 TABLET ORAL TWICE DAILY
Qty: 60 | Refills: 1 | Status: ACTIVE | COMMUNITY
Start: 2021-01-01 | End: 1900-01-01

## 2021-01-01 RX ORDER — OLANZAPINE 2.5 MG/1
2.5 TABLET, FILM COATED ORAL DAILY
Qty: 30 | Refills: 1 | Status: ACTIVE | COMMUNITY
Start: 2021-01-01 | End: 1900-01-01

## 2021-01-01 RX ORDER — VITS A,C,E/LUTEIN/MINERALS 300MCG-200
TABLET ORAL
Refills: 0 | Status: DISCONTINUED | COMMUNITY
End: 2021-01-01

## 2021-01-01 RX ORDER — LAMOTRIGINE 25 MG/1
25 TABLET ORAL
Qty: 120 | Refills: 0 | Status: DISCONTINUED | COMMUNITY
Start: 2021-01-01 | End: 2021-01-01

## 2021-01-01 RX ORDER — APIXABAN 5 MG/1
5 TABLET, FILM COATED ORAL TWICE DAILY
Refills: 0 | Status: ACTIVE | COMMUNITY

## 2021-01-06 NOTE — PROCEDURE
[FreeTextEntry1] : Using sterile instrumentation debridement of all nails manually and electrically to decrease thickness, pain and girth and make shoe gear more comfortable with "slant back" procedure of any bordering spicules causing pain\par I have had a lengthy discussion with the patient regarding overall skincare. The importance of the type of socks, the type of shoes, and the type of overall foot hygiene is important to help control and prevent eruptions especially over extreme weather changes. This included but was not limited to hydration and lubrication, dove soap, triple rinse clothing and linens. I also explained the importance of thorough drying of both feet especially the web spaces. Given the extreme temperatures back in a car I also reviewed the type of shoes that would help reduce the chances of cracking of the skin especially leading to fissuring of the heels. Overall skincare precautions were reviewed education literature dispensed in the patient's questions asked and answered appropriately.\par \par follow up prn\par

## 2021-02-13 NOTE — PHYSICAL EXAM
[FreeTextEntry1] : \par \par Mental Status: Alert to person, knows he is in hospital, does not know which hospital. Thinks the year is 1972. Thinks the month is September. Thinks he is 65 years old. Able to calculate number of quarters in $1.50. Able to name bracelet, glasses. \par CN: , VFF grossly full \par III, IV, VI, PERRL, EOMI \par V sensation normal to light touch\par VII normal squint vs resistance, normal smile, face symmetric \par VIII: decreased hearing to voice \par IX, X normal gag, symmetric palate, uvula raises midline \par XI normal shrug versus resistance and lateralization of head versus resistance \par XII tongue symmetric, normal strength, no tremor fasciculations \par Motor: full strength throughout \par Sensory: normal to LT \par Brachoradialis, biceps, triceps 1+ , patella and ankles - unabe to elicit  \par Coordination: no dysmetria on FNF \par Gait : deferred \par \par \par \par

## 2021-02-13 NOTE — ASSESSMENT
[FreeTextEntry1] : Recommendation: \par \par Continue  mg bid\par start lamotrigine 25 mg qdaily and slowly increase to 50 mg bid. \par Physical therapy - recommend home physical therapy due to covid risk\par Boby unable to get COVID vaccine, nor his wife - added their names to [facility] list \par repeat routine EEG and ambulatory EEG - history of subclinical seizures  \par Discussed fall and seizure precautions (no bathing alone, etc). He does not drive. \par Return to clinic in 2 months, telehealth visit okay\par

## 2021-02-13 NOTE — HISTORY OF PRESENT ILLNESS
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 \par Interval history: Doing well, no clinical events concerning for seizure. No episodes of staring, confusion, aphasia. No generalized shaking or stiffening. No falls at home. On  mg bid. Lives with wife.\par He was admitted to [facility] 1/22/21 after wife witnessed probable seizure. While laying in bed, report of sudden onset of stiffness, extension of both arms with shaking, then unarousable. His eyes were closed. Confused at offset. Prolactin upon admission was high (32) [ 4.1-18.4]. No sign of infection, he was afebrile. MRI brain without acute process. He was started on levetiracetam 500 mg bid and lamotrigine 25 mg qdaily. \par  \par Connected to EEG and found to have single electrographic subclinical seizure of left temporal onset and mild generalized slowing. \par \par A few weeks prior he had episode of transient aphasia. Unclear if this was related to focal seizure or TIA. \par \par Current medications: \par eliquis 5 mg bid\par atorvastatin 20 mg \par benfotiamine (vitamin b1)\par centrum silver \par ferrous sulfate \par losartan 50 mg  \par metoprolol ER 50 mg daily \par rivastigmine patch \par memantine 28 mg ER \par metformin 500 mg bid \par tamsulosin 0.4 mg daily \par \par Past Medical History: \par atrial fibrillation \par hypertension \par alzheimers \par high cholesterol \par diabetes \par BPH \par \par Allergies: none \par \par Social History\par \par Lives with wife \par Ambulates on own, able to bathe self, feed self  \par Has an aid home many days a week \par Works with physical therapy, balance and gait stability \par used to work as a  \par

## 2021-02-13 NOTE — DISCUSSION/SUMMARY
[FreeTextEntry1] : Boby is a 87-year-old gentleman with pmh significant for atrial fibrillation, on eliquis, htn, hld, Diabetes Mellitus, prior left parieto-occipital CVA, who presenting with bilateral tonic clonic seizure witnessed by wife in January. Connected to EEG and single subclinical brief seizure of left temporal onset was noted. \par Had recent admission prior to [facility] for garbled speech. Possible this was a focal seizure, not a TIA. \par \par No definite interval seizures since hospitalization in January. He is on 500 mg bid of levetiracetam. As increasing dose may make him more lethargic, irritable, will slowly add lamotrigine for dual therapy. \par \par Important to have good seizure control, at risk of serious injury if he hits head, falls with seizure due to systemic AC. \par

## 2021-02-13 NOTE — DATA REVIEWED
[de-identified] : 1/23/2021: small areas of left occipital and posterior left parietal encephalomalacia consistent with old infarction. Again demonstrated are scattered foci of high signal in the supratentorial white matter and right cerebellar hemisphere consistent with small vessel ischemia/infarction. No evidence of recent infarction with high signal on diffusion imaging. There is no evidence of recent or old hemorrhage on GRE.  [de-identified] : 1/22/21 : Inpatient Continuous EEG: \par Single subclinical electrographic seizure of left temporal onset. \par Mild generalized hemisphere slowing suggestive of a similar degree of diffuse or multifocal dysfunction.

## 2021-02-13 NOTE — REVIEW OF SYSTEMS
[Confused or Disoriented] : confusion [Memory Lapses or Loss] : memory loss [Loss Of Hearing] : hearing loss [Fever] : no fever [Anxiety] : no anxiety [Seizures] : no convulsions [Frequent Falls] : not falling

## 2021-02-19 PROBLEM — R56.9 FOCAL SEIZURE: Status: ACTIVE | Noted: 2021-01-01

## 2021-02-19 PROBLEM — Z86.2 HISTORY OF IRON DEFICIENCY ANEMIA: Status: RESOLVED | Noted: 2019-09-26 | Resolved: 2021-01-01

## 2021-02-19 PROBLEM — Z86.79 HISTORY OF HEART VALVE ABNORMALITY: Status: RESOLVED | Noted: 2018-06-26 | Resolved: 2021-01-01

## 2021-02-19 PROBLEM — G45.9 TIA (TRANSIENT ISCHEMIC ATTACK): Status: RESOLVED | Noted: 2020-01-01 | Resolved: 2021-01-01

## 2021-02-19 PROBLEM — D75.89 MACROCYTOSIS: Status: RESOLVED | Noted: 2019-09-05 | Resolved: 2021-01-01

## 2021-02-19 PROBLEM — M79.671 PAIN IN BOTH FEET: Status: RESOLVED | Noted: 2019-03-01 | Resolved: 2021-01-01

## 2021-02-19 NOTE — HISTORY OF PRESENT ILLNESS
[Post-hospitalization from ___ Hospital] : Post-hospitalization from [unfilled] Hospital [Admitted on: ___] : The patient was admitted on [unfilled] [Discharged on ___] : discharged on [unfilled] [Discharge Summary] : discharge summary [Pertinent Labs] : pertinent labs [Radiology Findings] : radiology findings [Discharge Med List] : discharge medication list [Med Reconciliation] : medication reconciliation has been completed [Patient Contacted By: ____] : and contacted by [unfilled] [FreeTextEntry2] : On 1/22/21 pt had a grand mal seizure while in bed. It lasted a few seconds. Pt's wife witnessed it and pt was post ictal afterward. She called 911 and he was taken to [facility]. Pt also had a small seizure while having the EEG. \par Since his d/c as per wife he is unsteady in walking but no falls. has an aide and walks with assistance. He has been having in home PT and is doing well.  No seizures since d/c. Pt here in office today and does not have any assistive device.

## 2021-02-19 NOTE — HEALTH RISK ASSESSMENT
[No] : In the past 12 months have you used drugs other than those required for medical reasons? No [No falls in past year] : Patient reported no falls in the past year [0] : 2) Feeling down, depressed, or hopeless: Not at all (0) [] : No [de-identified] : none [de-identified] : regular diet

## 2021-02-19 NOTE — REVIEW OF SYSTEMS
[Memory Loss] : memory loss [Negative] : Heme/Lymph [Fever] : no fever [Joint Pain] : no joint pain [de-identified] : dementia

## 2021-02-19 NOTE — PHYSICAL EXAM
[No Acute Distress] : no acute distress [Well Nourished] : well nourished [Well Developed] : well developed [No JVD] : no jugular venous distention [Supple] : supple [Normal] : no respiratory distress, lungs were clear to auscultation bilaterally and no accessory muscle use [Normal Rate] : normal rate  [Normal S1, S2] : normal S1 and S2 [No Edema] : there was no peripheral edema [Declined Rectal Exam] : declined rectal exam [No Joint Swelling] : no joint swelling [Coordination Grossly Intact] : coordination grossly intact [No Focal Deficits] : no focal deficits [Normal Gait] : normal gait [Normal Affect] : the affect was normal [Normal Mood] : the mood was normal [67108 - Moderate Complexity requires multiple possible diagnoses and/or the management options, moderate complexity of the medical data (tests, etc.) to be reviewed, and moderate risk of significant complications, morbidity, and/or mortality as well as co] : Moderate Complexity [de-identified] : irreg I/VI systolic murmur [FreeTextEntry1] : states it ws done by Dr Geiger

## 2021-03-07 PROBLEM — Z86.79 HISTORY OF HEART VALVE ABNORMALITY: Status: RESOLVED | Noted: 2018-06-25 | Resolved: 2021-01-01

## 2021-03-08 PROBLEM — E78.5 HYPERLIPIDEMIA: Status: ACTIVE | Noted: 2018-06-26

## 2021-03-08 PROBLEM — I38 VALVULAR HEART DISEASE: Status: ACTIVE | Noted: 2021-01-01

## 2021-03-09 PROBLEM — Z86.69 HISTORY OF SEIZURE DISORDER: Status: RESOLVED | Noted: 2021-01-01 | Resolved: 2021-01-01

## 2021-03-09 NOTE — DISCUSSION/SUMMARY
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 Boby has had atrial fibrillation since 1988. Prior attempts at electrocardioversion were unsuccessful. Adequate rate control of the ventricular response is achieved with the present medical regimen. A 3/14 Holter monitor demonstrated atrial fibrillation with an average ventricular rate of 64/minute. A 3.2 second pause without symptom correlation was noted. Lanoxin therapy administered at that time was discontinued. An elevated "[id number]" score is indicative of an increased risk of systemic/cerebral  emboli. .Prior  treatment with warfarin was discontinued due to concerns for falling/bleeding. However in 1/19 Apixaban was instituted following cardioembolic bilateral occipital strokes.\par \par I have recommended the following:\par 1 continue  the  present medical regimen\par 2 no further cardiac testing for this problem at this time\par \par \par Hypertension\par Hypertension is controlled on the present medical regimen. In the setting of diabetes atrial fibrillation  and probable atherosclerotic heart disease, beta blocker and Ace-I./ARB therapy are attractive. \par The most recent blood pressure levels have been low (95/50 mmHg) raising concern for the risk of symptomatic systemic hypotension\par \par I have recommended the following:\par 1 Discontinue losartan\par 2. Home blood pressure monitoring\par \par \par \par \par \par Valvular heart disease\par The 3/21  echocardiogram demonstrated mild -moderate  mitral insufficiency aortic valve sclerosis and mild aortic insufficiency. Moderate tricuspid insufficiency was also noted. Mild  pulmonary hypertension was reflected by pulmonary artery systolic pressure of 43 mmHg. The left atrial volume was markedly increased at 82 mL/m2. The left ventricle ejection fraction was 63%. The present cardiac physical examination is not suggestive of severe mitral/aortic regurgitation. In view of the lack of symptoms, absence of heart failure and clinical course continued monitoring without intervention is indicated at this time.\par \par I have recommended the following:\par 1 no further cardiac testing for this problem at this time\par \par \par \par Hyperlipidemia\par Hyperlipidemia represents a risk factor for the development of atherosclerotic heart disease. However there is no evidence of such to date. A 3/11  stress sestamibi study was normal. The left ventricle ejection fraction was 56% . The resting  3/21   electrocardiogram shows no evidence of myocardial ischemia or infarction. With a history of a stroke (Despite a cardioembolic etiology) the target LDL level is about 70.. HMG CoA reductase  inhibitor therapy has been effective. In  9/20   the  serum cholesterol level was 105   HDL 49   LDL 47 and triglycerides 49 . Consideration  will be given to the  reduction in dose of atorvastatin dependent on followup lipid levels. Non-pharmacological therapy, specifically diet and exercise are emphasized as major aspects of treatment.\par \par I have recommended the following:\par 1 low-salt low-fat low-cholesterol diet. Regular aerobic exercise\par 2 continue present medical regimen\par 3 target LDL level to about 100 as discussed above\par 4 Consideration for decreasing the dose of atorvastatin dependent on followup lipid levels as discussed above\par \par \par \par Dementia\par The working diagnosis is vascular dementia. In the setting of chronic atrial fibrillation multi-infarct dementia is a likely etiology. This problem represents the patient's major health threat\par .Further deterioration of mental and functional status is anticipated in the future with requirements for increasing social service help. This problem represents the patient's major health\par \par I have recommended  the following:\par 1. Workup and treatment as directed by internal medicine/neurology\par \par \par \par The diagnosis, prognosis, risks, options and alternatives were explained at length to the patient and family. All  questions were  answered. Issues discussed included atrial fibrillation, stroke, hyperlipidemia hypertension/hypotension   non invasive cardiac testing  and dementia. Elevated risk regarding medical decision-making for this encounter  was in part related to atrial fibrillation with the potential for bleeding complications versus embolic stroke. In addition management of an elderly patient with dementia   hypertension/hypotension  made treatment challenging. \par \par \par . Counseling and/or coordination of care\par Time was a significant factor for this patient encounter. Total time spent with the patient was 30   minutes. 50% of the time was devoted to counseling and/or coordination of care. \par \par \par \par

## 2021-03-09 NOTE — HISTORY OF PRESENT ILLNESS
[FreeTextEntry1] : 87-year-old man\par Routine followup\par "I feel okay." Mr. Mir denies chest pain, palpitations, shortness of breath at rest or syncope..

## 2021-03-17 NOTE — PROCEDURE
[FreeTextEntry1] : Using sterile instrumentation debridement of all nails manually and electrically to decrease thickness, pain and girth and make shoe gear more comfortable with "slant back" procedure of any bordering spicules causing pain\par follow up prn\par

## 2021-04-12 PROBLEM — G40.209 LOCALIZATION-RELATED (FOCAL) (PARTIAL) SYMPTOMATIC EPILEPSY AND EPILEPTIC SYNDROMES WITH COMPLEX PARTIAL SEIZURES, NOT INTRACTABLE, WITHOUT STATUS EPILEPTICUS: Status: ACTIVE | Noted: 2021-01-01

## 2021-04-12 NOTE — DISCUSSION/SUMMARY
[FreeTextEntry1] : Boby is an 87-year-old gentleman with pmh significant for atrial fibrillation, on eliquis, htn, hld, Diabetes Mellitus, prior left parieto-occipital CVA, who presenting with bilateral tonic clonic seizure witnessed by wife in January. Connected to EEG and single subclinical brief seizure of left temporal onset was noted. \par Had recent admission prior to [facility] for garbled speech. Possible this was a focal seizure, not a TIA. \par \par No definite interval seizures since hospitalization. Ambulatory EEG from 3/18-3/19/21 with occasional left temporal slowing, mild generalized slowing, no epileptiform patterns or seizures. \par \par On  mg bid and lamotrigine 50 mg bid. Wife reports he is more lethargic lately. Will up-titrate lamotrigine with eventual goal of slowly tapering down levetiracetam (more likely to be sedating). \par \par Important to have good seizure control, at risk of serious injury if he hits head, falls with seizure due to systemic AC. \par  \par \par

## 2021-04-12 NOTE — ASSESSMENT
[FreeTextEntry1] : Plan: \par Continue levetiracetam 500 mg bid \par Increase lamotrigine:\par Week 1: lamotrigine 75 mg in morning, 50 mg at night \par Week 2: lamotrigine 75 mg bid \par Week 3: lamotrigine 100 mg qam and 75 mg at night \par Week 4: lamotrigine 100  mg bid \par \par Levetiracetam, lamotrigine level today, vitamin b12 level and vitamin d level and CMP today \par \par Return to clinic in 3-4 months \par

## 2021-04-12 NOTE — HISTORY OF PRESENT ILLNESS
[FreeTextEntry1] : Last seen in clinic on 2/11/21. Presenting with wife. Ambulating unassisted. No clinical events concerning for seizure. Wife does not he has continued word finding difficulty. No convulsions. \par \par Lives with wife. Has a home health aid five days a week. No falls. As per wife, gait has improved with physical therapy. No falls. On eliquis. Sometimes they go for a walk outside but overall are very cautious. 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 No episodes of staring, confusion, aphasia. No generalized shaking or stiffening. No falls at home. On  mg bid. Lives with wife.\par He was admitted to [facility] 1/22/21 after wife witnessed probable seizure. While laying in bed, report of sudden onset of stiffness, extension of both arms with shaking, then unarousable. His eyes were closed. Confused at offset. Prolactin upon admission was high (32) [ 4.1-18.4]. No sign of infection, he was afebrile. MRI brain without acute process. He was started on levetiracetam 500 mg bid and lamotrigine 25 mg qdaily. \par  \par Connected to EEG and found to have single electrographic subclinical seizure of left temporal onset and mild generalized slowing. \par \par A few weeks prior he had episode of transient aphasia. Unclear if this was related to focal seizure or TIA. \par \par Current medications: (confirmed at current visit) - 4/12/21 \par levetiracetam 500 mg bid \par lamotrigine 50 mg bid \par eliquis 5 mg bid\par atorvastatin 20 mg \par benfotiamine (vitamin b1)\par centrum silver \par ferrous sulfate \par metoprolol ER 50 mg daily \par rivastigmine patch \par memantine 28 mg ER \par metformin 500 mg bid \par tamsulosin 0.4 mg daily \par \par Past Medical History: \par atrial fibrillation \par hypertension \par alzheimers \par high cholesterol \par diabetes \par BPH \par \par Allergies: none \par \par Social History\par \par Lives with wife \par Ambulates on own, able to bathe self, feed self  \par Has a home health aid 5 days a week \par Works with physical therapy, balance and gait stability - 2 x a week \par used to work as a  \par

## 2021-04-12 NOTE — DATA REVIEWED
[de-identified] : 3/18-3/19/21 - Occasional left temporal slowing suggestive of focal dysfunction in this region. Mild generalized slowing suggestive\par of diffuse or multifocal dysfunction, however this does not rule out a diagnosis. \par 1/22/21 : Inpatient Continuous EEG: \par Single subclinical electrographic seizure of left temporal onset. \par Mild generalized hemisphere slowing suggestive of a similar degree of diffuse or multifocal dysfunction. \par  [de-identified] : 1/23/2021: small areas of left occipital and posterior left parietal encephalomalacia consistent with old infarction. Again demonstrated are scattered foci of high signal in the supratentorial white matter and right cerebellar hemisphere consistent with small vessel ischemia/infarction. No evidence of recent infarction with high signal on diffusion imaging. There is no evidence of recent or old hemorrhage on GRE.

## 2021-04-12 NOTE — PHYSICAL EXAM
[FreeTextEntry1] : \par Mental Status: Alert to person, knows he is in hospital, does not know which hospital. Thinks the year is 2070. Thinks month is September. Unable to name glasses or button. Can name pen. 3/3 registration of 3 items, 0/3 recall at 3 minutes, 2/3 when given category cues. \par CN: , VFF grossly full \par III, IV, VI, PERRL, EOMI \par V sensation normal to light touch\par VII normal squint vs resistance, normal smile, face symmetric \par VIII: decreased hearing to voice \par IX, X normal gag, symmetric palate, uvula raises midline \par XI normal shrug versus resistance and lateralization of head versus resistance \par XII tongue symmetric, normal strength, no tremor fasciculations \par Motor: full strength throughout \par Sensory: normal to LT \par Brachioradialis, biceps, triceps 1+ , patella and ankles - unable to elicit \par Coordination: no dysmetria on FNF \par Gait : shuffling, cautious gait , ambulates unassisted \par \par \par

## 2021-04-12 NOTE — REVIEW OF SYSTEMS
[Confused or Disoriented] : confusion [Fever] : no fever [Depression] : no depression [Seizures] : no convulsions [Frequent Falls] : not falling

## 2021-05-12 PROBLEM — I10 HYPERTENSION: Status: ACTIVE | Noted: 2018-06-26

## 2021-05-12 NOTE — HEALTH RISK ASSESSMENT
[No] : In the past 12 months have you used drugs other than those required for medical reasons? No [Any fall with injury in past year] : Patient reported fall with injury in the past year [0] : 2) Feeling down, depressed, or hopeless: Not at all (0) [] : No [de-identified] : none [de-identified] : regular diet

## 2021-05-12 NOTE — PHYSICAL EXAM
[No Acute Distress] : no acute distress [Well Nourished] : well nourished [Well Developed] : well developed [No JVD] : no jugular venous distention [Supple] : supple [Normal] : no respiratory distress, lungs were clear to auscultation bilaterally and no accessory muscle use [Normal Rate] : normal rate  [Normal S1, S2] : normal S1 and S2 [No Edema] : there was no peripheral edema [Declined Rectal Exam] : declined rectal exam [No Joint Swelling] : no joint swelling [Coordination Grossly Intact] : coordination grossly intact [No Focal Deficits] : no focal deficits [Normal Gait] : normal gait [Normal Affect] : the affect was normal [Normal Mood] : the mood was normal [de-identified] : irreg I/VI systolic murmur [FreeTextEntry1] : states it ws done by Dr Geiger [de-identified] : quiet overall, but responds to questions

## 2021-05-12 NOTE — HISTORY OF PRESENT ILLNESS
[Spouse] : spouse [FreeTextEntry1] : follow-up  [de-identified] : Pt here for f/u visit.  His wife is with him and gives most of the history. Overall stable. As per his wife he gets episodes where his speech is incoherent for a couple of sentences and then it returns to normal. He is sometimes fatigued and she has spoken to neurology about this. The goal is to taper keppra as it may be causing pt to be more fatigued.

## 2021-05-28 PROBLEM — M79.672 LEFT FOOT PAIN: Status: ACTIVE | Noted: 2019-05-21

## 2021-05-28 PROBLEM — I70.91 GENERALIZED ATHEROSCLEROSIS WITHOUT GANGRENE: Status: ACTIVE | Noted: 2019-03-01

## 2021-05-28 PROBLEM — E11.42 CONTROLLED TYPE 2 DIABETES MELLITUS WITH DIABETIC POLYNEUROPATHY, WITHOUT LONG-TERM CURRENT USE OF INSULIN: Status: ACTIVE | Noted: 2018-11-29

## 2021-05-28 PROBLEM — M79.671 CHRONIC PAIN IN RIGHT FOOT: Status: ACTIVE | Noted: 2020-01-01

## 2021-05-28 PROBLEM — B35.1 DERMATOPHYTOSIS, NAIL: Status: ACTIVE | Noted: 2019-03-01

## 2021-06-04 NOTE — CONSULT LETTER
[Dear  ___] : Dear  [unfilled], [Courtesy Letter:] : I had the pleasure of seeing your patient, [unfilled], in my office today. [Please see my note below.] : Please see my note below. [Consult Closing:] : Thank you very much for allowing me to participate in the care of this patient.  If you have any questions, please do not hesitate to contact me. [Sincerely,] : Sincerely, [FreeTextEntry3] : Osei Negrete MD, MPH\par Attending Physician\par Hematology Oncology\par [facility] [place]\par [facility]\par

## 2021-06-04 NOTE — HISTORY OF PRESENT ILLNESS
[de-identified] : Mr. Boby Mir is 85 year old male who is referred by Dr. Celestin for initial consult for macrocytic anemia.\par \par Pt with hx of A. Fib, HTN, BPH,  Type II diabetes with diabetic polyneuropathy, dementia, CVA in Januray 2019, on Eliquis 5mg bid who was advised to see hematologist for continual decreased of his hgb in the last few years. Ferritin, vitamin B12, and folate were tested with normal limits. Patient has no associated signs or symtoms. \par \par His last blood test on 8/20/2019  Hgb 11.7 Hct - 37.8 .6  Plts 185, Ferritin - 72. vitamin B12 - 408, Folate > 20.0, TSH 2.72\par \par Last normal colonoscopy was in 2013 \par no personality or family  history of hematological or oncological disoder\par \par No fevers chills night sweats\par No fatigue, tiredness, apathy\par No appetite loss or weight loss/weight gain\par No chest pain, shortness of breath, palpitation, dizziness\par No abdominal pain, nausea, vomiting, diarrhea, constipation\par No bright red blood per rectum, hematemesis or melena\par No hematuria, dysuria, frequency, urgency\par No headaches, visual changes, focal weakness, tingling, numbness\par \par \par  [de-identified] : He is seen today for follow up\par Accompanied by his wife\par \par Energy levels stable\par Continues to be forgetful\par Was recently admitted to [facility] with neurological symptoms, likely TIA\par \par He is on oral iron pills once a day\par Also on eliquis\par \par

## 2021-06-04 NOTE — PHYSICAL EXAM
[Capable of only limited self care, confined to bed or chair more than 50% of waking hours] : Status 3- Capable of only limited self care, confined to bed or chair more than 50% of waking hours [Normal] : normal spine exam without palpable tenderness, no kyphosis or scoliosis [de-identified] : Dementia

## 2021-06-07 PROBLEM — I63.9 CEREBROVASCULAR ACCIDENT (CVA), UNSPECIFIED MECHANISM: Status: ACTIVE | Noted: 2019-01-24

## 2021-06-07 PROBLEM — R39.9 UTI SYMPTOMS: Status: ACTIVE | Noted: 2021-01-01

## 2021-06-07 PROBLEM — I48.20 CHRONIC ATRIAL FIBRILLATION: Status: ACTIVE | Noted: 2019-02-04

## 2021-06-07 PROBLEM — R31.29 HEMATURIA, MICROSCOPIC: Status: ACTIVE | Noted: 2021-01-01

## 2021-06-07 PROBLEM — F03.90 DEMENTIA: Status: ACTIVE | Noted: 2018-06-25

## 2021-06-07 PROBLEM — N40.0 BPH (BENIGN PROSTATIC HYPERPLASIA): Status: ACTIVE | Noted: 2019-02-13

## 2021-06-07 NOTE — PHYSICAL EXAM
[No Acute Distress] : no acute distress [Well Nourished] : well nourished [Well Developed] : well developed [No JVD] : no jugular venous distention [Supple] : supple [Normal] : no respiratory distress, lungs were clear to auscultation bilaterally and no accessory muscle use [Normal Rate] : normal rate  [Normal S1, S2] : normal S1 and S2 [No Edema] : there was no peripheral edema [Declined Rectal Exam] : declined rectal exam [No Joint Swelling] : no joint swelling [Coordination Grossly Intact] : coordination grossly intact [No Focal Deficits] : no focal deficits [Normal Gait] : normal gait [Normal Affect] : the affect was normal [Normal Mood] : the mood was normal [de-identified] : irreg   I/VI systolic murmur [FreeTextEntry1] : states it ws done by Dr Geiger [de-identified] : more talkative than last visit,

## 2021-06-07 NOTE — HISTORY OF PRESENT ILLNESS
[Spouse] : spouse [FreeTextEntry1] : follow-up from ER  [de-identified] : Pt was taken to the ER on Saturday. He was also in the ER on 5/31/21 and was given antibiotics for a UTI. He also had CT Head done due to a fall which did not show any acute findings. Then he returned to ER on 6/4/21 because of swelling in the legs. His balance and behavior was still not at his baseline. \par Pt had repeat UA and UC&S, The US was still positive for blood, but the culture is now negative, no growth. He took the whole course of antibiotics. Cefdinir. He has an upcoming appt with Dr Geiger\par Pt's wife is very upset presently. She feels that she is not getting enough support overall. She says that pt has been up at nights many times. Waking up 4-5 times per night. Says that she feels unsafe. She is very frustrated. Now that the UTI has resolved, she is worried that his behavior is not returning to his baseline and his dementia is progressing.

## 2021-06-07 NOTE — HEALTH RISK ASSESSMENT
[No] : In the past 12 months have you used drugs other than those required for medical reasons? No [Any fall with injury in past year] : Patient reported fall with injury in the past year [0] : 1) Little interest or pleasure doing things: Not at all (0) [] : No [de-identified] : none [de-identified] : regular diet

## 2021-06-23 PROBLEM — D53.9 ANEMIA, MACROCYTIC: Status: ACTIVE | Noted: 2019-09-05

## 2021-08-02 ENCOUNTER — NON-APPOINTMENT (OUTPATIENT)
Age: 86
End: 2021-08-02

## 2021-08-06 ENCOUNTER — APPOINTMENT (OUTPATIENT)
Dept: PODIATRY | Facility: CLINIC | Age: 86
End: 2021-08-06

## 2021-09-09 ENCOUNTER — APPOINTMENT (OUTPATIENT)
Dept: CARDIOLOGY | Facility: CLINIC | Age: 86
End: 2021-09-09

## 2021-09-22 ENCOUNTER — APPOINTMENT (OUTPATIENT)
Dept: INTERNAL MEDICINE | Facility: CLINIC | Age: 86
End: 2021-09-22